# Patient Record
Sex: MALE | Race: WHITE | NOT HISPANIC OR LATINO | Employment: PART TIME | URBAN - METROPOLITAN AREA
[De-identification: names, ages, dates, MRNs, and addresses within clinical notes are randomized per-mention and may not be internally consistent; named-entity substitution may affect disease eponyms.]

---

## 2023-09-28 PROBLEM — Z87.820 PERSONAL HISTORY OF TRAUMATIC BRAIN INJURY: Status: ACTIVE | Noted: 2023-09-28

## 2023-09-28 PROBLEM — G47.9 SLEEP DIFFICULTIES: Status: ACTIVE | Noted: 2023-09-28

## 2023-09-28 PROBLEM — F41.9 ANXIETY: Status: ACTIVE | Noted: 2023-09-28

## 2023-09-28 PROBLEM — F41.1 GENERALIZED ANXIETY DISORDER: Status: ACTIVE | Noted: 2023-09-28

## 2023-09-28 PROBLEM — S09.90XA HEAD INJURY: Status: ACTIVE | Noted: 2023-09-28

## 2023-09-28 PROBLEM — R42 DIZZINESS: Status: ACTIVE | Noted: 2023-09-28

## 2023-09-28 PROBLEM — F32.1 DEPRESSION, MAJOR, SINGLE EPISODE, MODERATE (MULTI): Status: ACTIVE | Noted: 2023-09-28

## 2023-09-28 RX ORDER — SERTRALINE HYDROCHLORIDE 50 MG/1
1 TABLET, FILM COATED ORAL DAILY
COMMUNITY
Start: 2022-06-23

## 2023-09-28 RX ORDER — TALC
1 POWDER (GRAM) TOPICAL NIGHTLY
COMMUNITY

## 2023-10-03 ENCOUNTER — APPOINTMENT (OUTPATIENT)
Dept: BEHAVIORAL HEALTH | Facility: CLINIC | Age: 23
End: 2023-10-03
Payer: COMMERCIAL

## 2025-03-05 ENCOUNTER — APPOINTMENT (OUTPATIENT)
Dept: RADIOLOGY | Facility: HOSPITAL | Age: 25
End: 2025-03-05
Payer: COMMERCIAL

## 2025-03-05 ENCOUNTER — APPOINTMENT (OUTPATIENT)
Dept: CARDIOLOGY | Facility: HOSPITAL | Age: 25
End: 2025-03-05
Payer: COMMERCIAL

## 2025-03-05 ENCOUNTER — HOSPITAL ENCOUNTER (INPATIENT)
Facility: HOSPITAL | Age: 25
LOS: 2 days | Discharge: HOME | End: 2025-03-07
Attending: EMERGENCY MEDICINE | Admitting: STUDENT IN AN ORGANIZED HEALTH CARE EDUCATION/TRAINING PROGRAM
Payer: COMMERCIAL

## 2025-03-05 DIAGNOSIS — D72.829 LEUKOCYTOSIS, UNSPECIFIED TYPE: ICD-10-CM

## 2025-03-05 DIAGNOSIS — K52.9 ENTEROCOLITIS: Primary | ICD-10-CM

## 2025-03-05 DIAGNOSIS — Z78.9 MALE-TO-FEMALE TRANSGENDER PERSON: ICD-10-CM

## 2025-03-05 LAB
ALBUMIN SERPL BCP-MCNC: 5.1 G/DL (ref 3.4–5)
ALP SERPL-CCNC: 83 U/L (ref 33–120)
ALT SERPL W P-5'-P-CCNC: 11 U/L (ref 10–52)
ANION GAP SERPL CALC-SCNC: 15 MMOL/L (ref 10–20)
APPEARANCE UR: CLEAR
AST SERPL W P-5'-P-CCNC: 16 U/L (ref 9–39)
BASOPHILS # BLD AUTO: 0.09 X10*3/UL (ref 0–0.1)
BASOPHILS NFR BLD AUTO: 0.3 %
BILIRUB SERPL-MCNC: 0.7 MG/DL (ref 0–1.2)
BILIRUB UR STRIP.AUTO-MCNC: NEGATIVE MG/DL
BUN SERPL-MCNC: 16 MG/DL (ref 6–23)
CALCIUM SERPL-MCNC: 9.5 MG/DL (ref 8.6–10.3)
CARDIAC TROPONIN I PNL SERPL HS: <3 NG/L (ref 0–20)
CARDIAC TROPONIN I PNL SERPL HS: <3 NG/L (ref 0–20)
CHLORIDE SERPL-SCNC: 103 MMOL/L (ref 98–107)
CO2 SERPL-SCNC: 24 MMOL/L (ref 21–32)
COLOR UR: ABNORMAL
CREAT SERPL-MCNC: 0.96 MG/DL (ref 0.5–1.3)
CRP SERPL-MCNC: 0.42 MG/DL
D DIMER PPP FEU-MCNC: 281 NG/ML FEU
EGFRCR SERPLBLD CKD-EPI 2021: >90 ML/MIN/1.73M*2
EOSINOPHIL # BLD AUTO: 0.05 X10*3/UL (ref 0–0.7)
EOSINOPHIL NFR BLD AUTO: 0.1 %
ERYTHROCYTE [DISTWIDTH] IN BLOOD BY AUTOMATED COUNT: 11.4 % (ref 11.5–14.5)
ERYTHROCYTE [SEDIMENTATION RATE] IN BLOOD BY WESTERGREN METHOD: 1 MM/H (ref 0–20)
GLUCOSE BLD MANUAL STRIP-MCNC: 120 MG/DL (ref 74–99)
GLUCOSE BLD MANUAL STRIP-MCNC: 135 MG/DL (ref 74–99)
GLUCOSE SERPL-MCNC: 118 MG/DL (ref 74–99)
GLUCOSE UR STRIP.AUTO-MCNC: NORMAL MG/DL
HCT VFR BLD AUTO: 47.3 % (ref 41–52)
HGB BLD-MCNC: 16.1 G/DL (ref 13.5–17.5)
IMM GRANULOCYTES # BLD AUTO: 0.21 X10*3/UL (ref 0–0.7)
IMM GRANULOCYTES NFR BLD AUTO: 0.6 % (ref 0–0.9)
KETONES UR STRIP.AUTO-MCNC: ABNORMAL MG/DL
LACTATE SERPL-SCNC: 1.7 MMOL/L (ref 0.4–2)
LEUKOCYTE ESTERASE UR QL STRIP.AUTO: NEGATIVE
LYMPHOCYTES # BLD AUTO: 1.02 X10*3/UL (ref 1.2–4.8)
LYMPHOCYTES NFR BLD AUTO: 2.9 %
MAGNESIUM SERPL-MCNC: 1.67 MG/DL (ref 1.6–2.4)
MCH RBC QN AUTO: 30.6 PG (ref 26–34)
MCHC RBC AUTO-ENTMCNC: 34 G/DL (ref 32–36)
MCV RBC AUTO: 90 FL (ref 80–100)
MONOCYTES # BLD AUTO: 1.5 X10*3/UL (ref 0.1–1)
MONOCYTES NFR BLD AUTO: 4.3 %
NEUTROPHILS # BLD AUTO: 32.22 X10*3/UL (ref 1.2–7.7)
NEUTROPHILS NFR BLD AUTO: 91.8 %
NITRITE UR QL STRIP.AUTO: NEGATIVE
NRBC BLD-RTO: 0 /100 WBCS (ref 0–0)
PH UR STRIP.AUTO: 6 [PH]
PLATELET # BLD AUTO: 373 X10*3/UL (ref 150–450)
POTASSIUM SERPL-SCNC: 3.7 MMOL/L (ref 3.5–5.3)
PROT SERPL-MCNC: 8 G/DL (ref 6.4–8.2)
PROT UR STRIP.AUTO-MCNC: NEGATIVE MG/DL
RBC # BLD AUTO: 5.27 X10*6/UL (ref 4.5–5.9)
RBC # UR STRIP.AUTO: NEGATIVE MG/DL
SODIUM SERPL-SCNC: 138 MMOL/L (ref 136–145)
SP GR UR STRIP.AUTO: 1.05
UROBILINOGEN UR STRIP.AUTO-MCNC: NORMAL MG/DL
WBC # BLD AUTO: 35.1 X10*3/UL (ref 4.4–11.3)

## 2025-03-05 PROCEDURE — 99285 EMERGENCY DEPT VISIT HI MDM: CPT | Mod: 25 | Performed by: EMERGENCY MEDICINE

## 2025-03-05 PROCEDURE — 80053 COMPREHEN METABOLIC PANEL: CPT

## 2025-03-05 PROCEDURE — 2500000004 HC RX 250 GENERAL PHARMACY W/ HCPCS (ALT 636 FOR OP/ED)

## 2025-03-05 PROCEDURE — 2060000001 HC INTERMEDIATE ICU ROOM DAILY

## 2025-03-05 PROCEDURE — 74177 CT ABD & PELVIS W/CONTRAST: CPT | Mod: FOREIGN READ | Performed by: RADIOLOGY

## 2025-03-05 PROCEDURE — 93005 ELECTROCARDIOGRAM TRACING: CPT

## 2025-03-05 PROCEDURE — 96365 THER/PROPH/DIAG IV INF INIT: CPT

## 2025-03-05 PROCEDURE — 96367 TX/PROPH/DG ADDL SEQ IV INF: CPT

## 2025-03-05 PROCEDURE — 2550000001 HC RX 255 CONTRASTS: Performed by: EMERGENCY MEDICINE

## 2025-03-05 PROCEDURE — 87040 BLOOD CULTURE FOR BACTERIA: CPT | Mod: STJLAB

## 2025-03-05 PROCEDURE — 83605 ASSAY OF LACTIC ACID: CPT

## 2025-03-05 PROCEDURE — 85379 FIBRIN DEGRADATION QUANT: CPT

## 2025-03-05 PROCEDURE — 93010 ELECTROCARDIOGRAM REPORT: CPT | Performed by: INTERNAL MEDICINE

## 2025-03-05 PROCEDURE — 96375 TX/PRO/DX INJ NEW DRUG ADDON: CPT

## 2025-03-05 PROCEDURE — 85652 RBC SED RATE AUTOMATED: CPT

## 2025-03-05 PROCEDURE — 74177 CT ABD & PELVIS W/CONTRAST: CPT

## 2025-03-05 PROCEDURE — 96361 HYDRATE IV INFUSION ADD-ON: CPT

## 2025-03-05 PROCEDURE — 83735 ASSAY OF MAGNESIUM: CPT

## 2025-03-05 PROCEDURE — 2500000001 HC RX 250 WO HCPCS SELF ADMINISTERED DRUGS (ALT 637 FOR MEDICARE OP)

## 2025-03-05 PROCEDURE — 85025 COMPLETE CBC W/AUTO DIFF WBC: CPT

## 2025-03-05 PROCEDURE — 86140 C-REACTIVE PROTEIN: CPT

## 2025-03-05 PROCEDURE — 99223 1ST HOSP IP/OBS HIGH 75: CPT

## 2025-03-05 PROCEDURE — 71045 X-RAY EXAM CHEST 1 VIEW: CPT | Mod: FOREIGN READ | Performed by: RADIOLOGY

## 2025-03-05 PROCEDURE — 96376 TX/PRO/DX INJ SAME DRUG ADON: CPT

## 2025-03-05 PROCEDURE — 81003 URINALYSIS AUTO W/O SCOPE: CPT

## 2025-03-05 PROCEDURE — 82947 ASSAY GLUCOSE BLOOD QUANT: CPT

## 2025-03-05 PROCEDURE — 36415 COLL VENOUS BLD VENIPUNCTURE: CPT

## 2025-03-05 PROCEDURE — 84484 ASSAY OF TROPONIN QUANT: CPT

## 2025-03-05 PROCEDURE — 99285 EMERGENCY DEPT VISIT HI MDM: CPT | Performed by: EMERGENCY MEDICINE

## 2025-03-05 PROCEDURE — 71045 X-RAY EXAM CHEST 1 VIEW: CPT

## 2025-03-05 RX ORDER — ESTRADIOL 1 MG/1
4 TABLET ORAL EVERY MORNING
Status: DISCONTINUED | OUTPATIENT
Start: 2025-03-06 | End: 2025-03-07 | Stop reason: HOSPADM

## 2025-03-05 RX ORDER — DICYCLOMINE HYDROCHLORIDE 10 MG/1
10 CAPSULE ORAL ONCE
Status: CANCELLED | OUTPATIENT
Start: 2025-03-05 | End: 2025-03-05

## 2025-03-05 RX ORDER — ONDANSETRON HYDROCHLORIDE 2 MG/ML
4 INJECTION, SOLUTION INTRAVENOUS EVERY 4 HOURS PRN
Status: DISCONTINUED | OUTPATIENT
Start: 2025-03-05 | End: 2025-03-07 | Stop reason: HOSPADM

## 2025-03-05 RX ORDER — ESTRADIOL 2 MG/1
4 TABLET ORAL EVERY MORNING
Status: ON HOLD | COMMUNITY
End: 2025-03-07

## 2025-03-05 RX ORDER — SODIUM CHLORIDE 9 MG/ML
100 INJECTION, SOLUTION INTRAVENOUS CONTINUOUS
Status: ACTIVE | OUTPATIENT
Start: 2025-03-05 | End: 2025-03-06

## 2025-03-05 RX ORDER — SPIRONOLACTONE 50 MG/1
100 TABLET, FILM COATED ORAL 2 TIMES DAILY
Status: DISCONTINUED | OUTPATIENT
Start: 2025-03-05 | End: 2025-03-07 | Stop reason: HOSPADM

## 2025-03-05 RX ORDER — SPIRONOLACTONE 100 MG/1
100 TABLET, FILM COATED ORAL 2 TIMES DAILY
COMMUNITY

## 2025-03-05 RX ORDER — ONDANSETRON HYDROCHLORIDE 2 MG/ML
4 INJECTION, SOLUTION INTRAVENOUS ONCE
Status: COMPLETED | OUTPATIENT
Start: 2025-03-05 | End: 2025-03-05

## 2025-03-05 RX ORDER — METRONIDAZOLE 500 MG/100ML
500 INJECTION, SOLUTION INTRAVENOUS EVERY 8 HOURS
Status: DISCONTINUED | OUTPATIENT
Start: 2025-03-06 | End: 2025-03-06

## 2025-03-05 RX ORDER — PROGESTERONE 200 MG/1
200 CAPSULE ORAL NIGHTLY
COMMUNITY

## 2025-03-05 RX ORDER — ONDANSETRON HYDROCHLORIDE 2 MG/ML
4 INJECTION, SOLUTION INTRAVENOUS ONCE
Status: CANCELLED | OUTPATIENT
Start: 2025-03-05 | End: 2025-03-05

## 2025-03-05 RX ORDER — ESTRADIOL 2 MG/1
2 TABLET ORAL NIGHTLY
COMMUNITY

## 2025-03-05 RX ORDER — PANTOPRAZOLE SODIUM 40 MG/10ML
40 INJECTION, POWDER, LYOPHILIZED, FOR SOLUTION INTRAVENOUS DAILY
Status: DISCONTINUED | OUTPATIENT
Start: 2025-03-05 | End: 2025-03-07 | Stop reason: HOSPADM

## 2025-03-05 RX ORDER — PROGESTERONE 200 MG/1
200 CAPSULE ORAL NIGHTLY
Status: DISCONTINUED | OUTPATIENT
Start: 2025-03-05 | End: 2025-03-07 | Stop reason: HOSPADM

## 2025-03-05 RX ORDER — METRONIDAZOLE 500 MG/100ML
500 INJECTION, SOLUTION INTRAVENOUS ONCE
Status: COMPLETED | OUTPATIENT
Start: 2025-03-05 | End: 2025-03-05

## 2025-03-05 RX ORDER — ESTRADIOL 1 MG/1
2 TABLET ORAL NIGHTLY
Status: DISCONTINUED | OUTPATIENT
Start: 2025-03-05 | End: 2025-03-07 | Stop reason: HOSPADM

## 2025-03-05 RX ADMIN — CEFTRIAXONE 2 G: 2 INJECTION, POWDER, FOR SOLUTION INTRAMUSCULAR; INTRAVENOUS at 16:41

## 2025-03-05 RX ADMIN — SODIUM CHLORIDE, POTASSIUM CHLORIDE, SODIUM LACTATE AND CALCIUM CHLORIDE 1000 ML: 600; 310; 30; 20 INJECTION, SOLUTION INTRAVENOUS at 15:29

## 2025-03-05 RX ADMIN — IOHEXOL 75 ML: 350 INJECTION, SOLUTION INTRAVENOUS at 16:46

## 2025-03-05 RX ADMIN — ONDANSETRON 4 MG: 2 INJECTION INTRAMUSCULAR; INTRAVENOUS at 15:29

## 2025-03-05 RX ADMIN — ONDANSETRON 4 MG: 2 INJECTION INTRAMUSCULAR; INTRAVENOUS at 22:23

## 2025-03-05 RX ADMIN — PANTOPRAZOLE SODIUM 40 MG: 40 INJECTION, POWDER, FOR SOLUTION INTRAVENOUS at 21:18

## 2025-03-05 RX ADMIN — SODIUM CHLORIDE 75 ML/HR: 9 INJECTION, SOLUTION INTRAVENOUS at 20:36

## 2025-03-05 RX ADMIN — SODIUM CHLORIDE 500 ML: 9 INJECTION, SOLUTION INTRAVENOUS at 20:33

## 2025-03-05 RX ADMIN — SODIUM CHLORIDE, SODIUM LACTATE, POTASSIUM CHLORIDE, AND CALCIUM CHLORIDE 1000 ML: .6; .31; .03; .02 INJECTION, SOLUTION INTRAVENOUS at 16:15

## 2025-03-05 RX ADMIN — ONDANSETRON 4 MG: 2 INJECTION INTRAMUSCULAR; INTRAVENOUS at 19:42

## 2025-03-05 RX ADMIN — METRONIDAZOLE 500 MG: 500 INJECTION, SOLUTION INTRAVENOUS at 17:35

## 2025-03-05 RX ADMIN — ESTRADIOL 2 MG: 1 TABLET ORAL at 21:18

## 2025-03-05 SDOH — SOCIAL STABILITY: SOCIAL INSECURITY: DO YOU FEEL ANYONE HAS EXPLOITED OR TAKEN ADVANTAGE OF YOU FINANCIALLY OR OF YOUR PERSONAL PROPERTY?: NO

## 2025-03-05 SDOH — ECONOMIC STABILITY: FOOD INSECURITY: WITHIN THE PAST 12 MONTHS, YOU WORRIED THAT YOUR FOOD WOULD RUN OUT BEFORE YOU GOT THE MONEY TO BUY MORE.: NEVER TRUE

## 2025-03-05 SDOH — SOCIAL STABILITY: SOCIAL INSECURITY
WITHIN THE LAST YEAR, HAVE YOU BEEN RAPED OR FORCED TO HAVE ANY KIND OF SEXUAL ACTIVITY BY YOUR PARTNER OR EX-PARTNER?: NO

## 2025-03-05 SDOH — SOCIAL STABILITY: SOCIAL INSECURITY
WITHIN THE LAST YEAR, HAVE YOU BEEN KICKED, HIT, SLAPPED, OR OTHERWISE PHYSICALLY HURT BY YOUR PARTNER OR EX-PARTNER?: NO

## 2025-03-05 SDOH — SOCIAL STABILITY: SOCIAL INSECURITY: WITHIN THE LAST YEAR, HAVE YOU BEEN AFRAID OF YOUR PARTNER OR EX-PARTNER?: NO

## 2025-03-05 SDOH — ECONOMIC STABILITY: FOOD INSECURITY: WITHIN THE PAST 12 MONTHS, THE FOOD YOU BOUGHT JUST DIDN'T LAST AND YOU DIDN'T HAVE MONEY TO GET MORE.: NEVER TRUE

## 2025-03-05 SDOH — ECONOMIC STABILITY: HOUSING INSECURITY: IN THE LAST 12 MONTHS, WAS THERE A TIME WHEN YOU WERE NOT ABLE TO PAY THE MORTGAGE OR RENT ON TIME?: NO

## 2025-03-05 SDOH — SOCIAL STABILITY: SOCIAL INSECURITY: DO YOU FEEL UNSAFE GOING BACK TO THE PLACE WHERE YOU ARE LIVING?: NO

## 2025-03-05 SDOH — ECONOMIC STABILITY: FOOD INSECURITY: HOW HARD IS IT FOR YOU TO PAY FOR THE VERY BASICS LIKE FOOD, HOUSING, MEDICAL CARE, AND HEATING?: VERY HARD

## 2025-03-05 SDOH — SOCIAL STABILITY: SOCIAL INSECURITY: HAVE YOU HAD THOUGHTS OF HARMING ANYONE ELSE?: NO

## 2025-03-05 SDOH — SOCIAL STABILITY: SOCIAL INSECURITY: ABUSE: ADULT

## 2025-03-05 SDOH — ECONOMIC STABILITY: INCOME INSECURITY: IN THE PAST 12 MONTHS HAS THE ELECTRIC, GAS, OIL, OR WATER COMPANY THREATENED TO SHUT OFF SERVICES IN YOUR HOME?: YES

## 2025-03-05 SDOH — SOCIAL STABILITY: SOCIAL INSECURITY: ARE YOU OR HAVE YOU BEEN THREATENED OR ABUSED PHYSICALLY, EMOTIONALLY, OR SEXUALLY BY ANYONE?: NO

## 2025-03-05 SDOH — ECONOMIC STABILITY: HOUSING INSECURITY: AT ANY TIME IN THE PAST 12 MONTHS, WERE YOU HOMELESS OR LIVING IN A SHELTER (INCLUDING NOW)?: NO

## 2025-03-05 SDOH — SOCIAL STABILITY: SOCIAL INSECURITY: WERE YOU ABLE TO COMPLETE ALL THE BEHAVIORAL HEALTH SCREENINGS?: YES

## 2025-03-05 SDOH — SOCIAL STABILITY: SOCIAL INSECURITY: WITHIN THE LAST YEAR, HAVE YOU BEEN HUMILIATED OR EMOTIONALLY ABUSED IN OTHER WAYS BY YOUR PARTNER OR EX-PARTNER?: NO

## 2025-03-05 SDOH — SOCIAL STABILITY: SOCIAL INSECURITY: HAS ANYONE EVER THREATENED TO HURT YOUR FAMILY OR YOUR PETS?: NO

## 2025-03-05 SDOH — SOCIAL STABILITY: SOCIAL INSECURITY: HAVE YOU HAD ANY THOUGHTS OF HARMING ANYONE ELSE?: NO

## 2025-03-05 SDOH — SOCIAL STABILITY: SOCIAL INSECURITY: DOES ANYONE TRY TO KEEP YOU FROM HAVING/CONTACTING OTHER FRIENDS OR DOING THINGS OUTSIDE YOUR HOME?: NO

## 2025-03-05 SDOH — ECONOMIC STABILITY: HOUSING INSECURITY: IN THE PAST 12 MONTHS, HOW MANY TIMES HAVE YOU MOVED WHERE YOU WERE LIVING?: 1

## 2025-03-05 SDOH — SOCIAL STABILITY: SOCIAL INSECURITY: ARE THERE ANY APPARENT SIGNS OF INJURIES/BEHAVIORS THAT COULD BE RELATED TO ABUSE/NEGLECT?: NO

## 2025-03-05 SDOH — ECONOMIC STABILITY: TRANSPORTATION INSECURITY: IN THE PAST 12 MONTHS, HAS LACK OF TRANSPORTATION KEPT YOU FROM MEDICAL APPOINTMENTS OR FROM GETTING MEDICATIONS?: NO

## 2025-03-05 ASSESSMENT — COGNITIVE AND FUNCTIONAL STATUS - GENERAL
DAILY ACTIVITIY SCORE: 24
PATIENT BASELINE BEDBOUND: NO
MOBILITY SCORE: 24

## 2025-03-05 ASSESSMENT — LIFESTYLE VARIABLES
HOW OFTEN DO YOU HAVE 6 OR MORE DRINKS ON ONE OCCASION: NEVER
TOTAL SCORE: 0
HOW OFTEN DO YOU HAVE A DRINK CONTAINING ALCOHOL: NEVER
HOW MANY STANDARD DRINKS CONTAINING ALCOHOL DO YOU HAVE ON A TYPICAL DAY: PATIENT DOES NOT DRINK
HAVE PEOPLE ANNOYED YOU BY CRITICIZING YOUR DRINKING: NO
SKIP TO QUESTIONS 9-10: 1
EVER HAD A DRINK FIRST THING IN THE MORNING TO STEADY YOUR NERVES TO GET RID OF A HANGOVER: NO
EVER FELT BAD OR GUILTY ABOUT YOUR DRINKING: NO
HAVE YOU EVER FELT YOU SHOULD CUT DOWN ON YOUR DRINKING: NO
AUDIT-C TOTAL SCORE: 0
AUDIT-C TOTAL SCORE: 0

## 2025-03-05 ASSESSMENT — ENCOUNTER SYMPTOMS
LIGHT-HEADEDNESS: 1
SHORTNESS OF BREATH: 1
CONSTIPATION: 0
FEVER: 0
NAUSEA: 1
ABDOMINAL PAIN: 1
CHILLS: 1
PALPITATIONS: 0
VOMITING: 1
BLOOD IN STOOL: 0
DIARRHEA: 1

## 2025-03-05 ASSESSMENT — PATIENT HEALTH QUESTIONNAIRE - PHQ9
2. FEELING DOWN, DEPRESSED OR HOPELESS: SEVERAL DAYS
SUM OF ALL RESPONSES TO PHQ9 QUESTIONS 1 & 2: 2
1. LITTLE INTEREST OR PLEASURE IN DOING THINGS: SEVERAL DAYS

## 2025-03-05 ASSESSMENT — ACTIVITIES OF DAILY LIVING (ADL)
HEARING - RIGHT EAR: FUNCTIONAL
JUDGMENT_ADEQUATE_SAFELY_COMPLETE_DAILY_ACTIVITIES: YES
PATIENT'S MEMORY ADEQUATE TO SAFELY COMPLETE DAILY ACTIVITIES?: YES
LACK_OF_TRANSPORTATION: NO
PATIENT'S MEMORY ADEQUATE TO SAFELY COMPLETE DAILY ACTIVITIES?: YES
HEARING - LEFT EAR: FUNCTIONAL
JUDGMENT_ADEQUATE_SAFELY_COMPLETE_DAILY_ACTIVITIES: YES
DRESSING YOURSELF: INDEPENDENT
TOILETING: INDEPENDENT
HEARING - LEFT EAR: FUNCTIONAL
FEEDING YOURSELF: INDEPENDENT
ADEQUATE_TO_COMPLETE_ADL: YES
GROOMING: INDEPENDENT
BATHING: INDEPENDENT
LACK_OF_TRANSPORTATION: NO
HEARING - RIGHT EAR: FUNCTIONAL
WALKS IN HOME: INDEPENDENT
DRESSING YOURSELF: INDEPENDENT
BATHING: INDEPENDENT
GROOMING: INDEPENDENT
TOILETING: INDEPENDENT
ADEQUATE_TO_COMPLETE_ADL: YES
WALKS IN HOME: INDEPENDENT
FEEDING YOURSELF: INDEPENDENT

## 2025-03-05 ASSESSMENT — PAIN - FUNCTIONAL ASSESSMENT
PAIN_FUNCTIONAL_ASSESSMENT: 0-10
PAIN_FUNCTIONAL_ASSESSMENT: 0-10

## 2025-03-05 ASSESSMENT — PAIN SCALES - GENERAL
PAINLEVEL_OUTOF10: 0 - NO PAIN
PAINLEVEL_OUTOF10: 2
PAINLEVEL_OUTOF10: 0 - NO PAIN

## 2025-03-05 ASSESSMENT — PAIN DESCRIPTION - DESCRIPTORS: DESCRIPTORS: TIGHTNESS

## 2025-03-05 NOTE — ED PROVIDER NOTES
History of Present Illness     History provided by: Patient  Limitations to History: None  External Records Reviewed with Brief Summary: Outpatient progress note from 12/12/2024 which showed patient's gender affirming care, most recent med list.    HPI:  Layen Razo is a 24 y.o. adult transgender female with past medical history of TBI, JOSEMANUEL, depression, anxiety, on gender affirming hormonal therapy (estradiol and spironolactone)who presents to the [facility] ED with nausea, vomiting, diarrhea that began this morning.  The patient also endorses spotting of vision and feeling that she may pass out.  The patient states they tried eating ice cream this morning but did end up having a bout of emesis after.  The emesis has been orange like in color.  She describes her abdominal pain worse around her bellybutton, suprapubically but goes across the entire lower abdomen as if someone was squeezing.  She also had about 5 bouts of watery brown diarrhea.  She also endorses chills.  She states that her mother was ill with similar symptoms over the weekend.  Patient denies any recent steroids, antibiotics or any other medications.  Patient denies any recent recreational drug use.    Physical Exam   Triage vitals:  T 36.5 °C (97.7 °F)  HR 80  BP (!) 77/45  RR 18  O2 99 % None (Room air)    Physical Exam  Vitals and nursing note reviewed.   Constitutional:       General: She is not in acute distress.     Appearance: Normal appearance. She is ill-appearing. She is not toxic-appearing or diaphoretic.   HENT:      Head: Normocephalic and atraumatic.      Right Ear: External ear normal.      Left Ear: External ear normal.      Mouth/Throat:      Mouth: Mucous membranes are dry.      Pharynx: No oropharyngeal exudate or posterior oropharyngeal erythema.   Eyes:      General: No scleral icterus.     Extraocular Movements: Extraocular movements intact.      Conjunctiva/sclera: Conjunctivae normal.      Pupils: Pupils are equal, round, and  reactive to light.   Cardiovascular:      Rate and Rhythm: Normal rate and regular rhythm.      Pulses: Normal pulses.   Pulmonary:      Effort: Pulmonary effort is normal. No respiratory distress.      Breath sounds: Normal breath sounds. No wheezing, rhonchi or rales.   Chest:      Chest wall: No tenderness.   Abdominal:      General: Abdomen is flat. Bowel sounds are normal. There is no distension.      Palpations: Abdomen is soft. There is no mass.      Tenderness: There is abdominal tenderness. There is guarding. There is no right CVA tenderness, left CVA tenderness or rebound.   Musculoskeletal:      Cervical back: Neck supple. No rigidity.      Right lower leg: No edema.      Left lower leg: No edema.   Lymphadenopathy:      Cervical: No cervical adenopathy.   Skin:     General: Skin is warm and dry.      Capillary Refill: Capillary refill takes 2 to 3 seconds.      Coloration: Skin is pale. Skin is not jaundiced.   Neurological:      General: No focal deficit present.      Mental Status: She is alert and oriented to person, place, and time. Mental status is at baseline.   Psychiatric:         Mood and Affect: Mood normal.         Thought Content: Thought content normal.          Medical Decision Making & ED Course   Medical Decision Makin y.o. adult transgender female presenting with sudden onset of abdominal pain, diarrhea, vomiting that began this morning.  Patient has had 5 bouts of watery brown diarrhea, orange emesis, bandlike squeezing abdominal pain along the lower abdomen.  Patient initially hypotensive with BP 77/45.  This was fluid responsive with BP resolution to 1 teens over 60s.  Patient's heart rate ranged from 80s to 90s.  SpO2 99% on room air.  Physical examination patient is tender to palpation periumbilically in the left lower quadrant and right lower quadrant with description of referred pain back to the Koko umbilical area.  His abdomen is soft, nondistended with normal bowel  sounds otherwise.  No lower extremity edema.  On cardiac examination patient has regular rate and rhythm.  EKG was ordered due to patient complaining of chest pain.  EKG showed normal sinus rhythm initially with repeat showing sinus tachycardia with no ST changes.  Troponins negative.  Low suspicion for ACS.  Due to patient's initial hypotension and CBC with significant leukocytosis of WBC 35.1, sepsis protocol was activated with blood cultures collected, IV fluids, lactate, empiric antibiotics.  D-dimer was also ordered as patient cannot be perked out and is on hormonal treatment.  D-dimer resulted to 81, low suspicion for acute PE.  Lactate 1.7, not sepsis.  CT abdomen pelvis ordered to assess for possible abdominal pathology causing patient's symptoms and abnormal labs.  She was treated with IV fluids, Zofran, empiric Rocephin and Flagyl for possible abdominal pathology.  C. difficile stool PCR ordered.  CTAP with IV contrast resolved with mild enterocolitis.  Overall low suspicion for acute appendicitis, pancreatitis, cholecystitis.  Due to patient's significant leukocytosis, colitis,  inability to tolerate p.o. will be admitted to hospital medicine further evaluation and management.  ----     Social Determinants of Health which Significantly Impact Care: None identified   EKG Independent Interpretation: EKG interpreted by myself. Please see ED Course for full interpretation.    Independent Result Review and Interpretation: Relevant laboratory and radiographic results were reviewed and independently interpreted by myself.  As necessary, they are commented on in the ED Course.    Chronic conditions affecting the patient's care: As documented above in [facility]    The patient was discussed with the following consultants/services: Hospitalist/Admitting Provider who accepted the patient for admission    Care Considerations: As documented above in [facility]    ED Course:  ED Course as of 03/05/25 2208   Wed Mar 05, 2025   1630  Neutrophils Absolute(!): 32.22 [DS]   1707 XR chest 1 view  Chest x-ray with no acute cardiopulmonary process. [DS]   1746 CT abdomen pelvis w IV contrast  CTAP with IV contrast with mild enterocolitis.  No signs of appendicitis, acute cholecystitis, pancreatitis or any other abnormalities. [DS]      ED Course User Index  [DS] Grecia Sylvester DO         Diagnoses as of 03/05/25 2208   Enterocolitis   Leukocytosis, unspecified type     Disposition   As a result of their workup, the patient will require admission to the hospital.  The patient was informed of her diagnosis.  The patient was given the opportunity to ask questions and I answered them. The patient agreed to be admitted to the hospital.    Procedures   Procedures    Patient seen and discussed with ED attending physician.    Grecia Sylvester DO  Family Medicine     This note has been transcribed using Dragon voice recognition system and there is a possibility of unintentional typing misprints.  Any information found to be copied from previous providers is done in the best interest of the patient to provide accurate, quality, and continuity of care.       Grecia Sylvester DO  Resident  03/05/25 2208

## 2025-03-06 LAB
ALBUMIN SERPL BCP-MCNC: 3.5 G/DL (ref 3.4–5)
ALP SERPL-CCNC: 52 U/L (ref 33–120)
ALT SERPL W P-5'-P-CCNC: 9 U/L (ref 7–52)
ANION GAP SERPL CALC-SCNC: 12 MMOL/L (ref 10–20)
AST SERPL W P-5'-P-CCNC: 11 U/L (ref 9–39)
ATRIAL RATE: 106 BPM
ATRIAL RATE: 82 BPM
BILIRUB SERPL-MCNC: 0.5 MG/DL (ref 0–1.2)
BUN SERPL-MCNC: 14 MG/DL (ref 6–23)
C COLI+JEJ+UPSA DNA STL QL NAA+PROBE: NOT DETECTED
C DIF TOX TCDA+TCDB STL QL NAA+PROBE: NOT DETECTED
CALCIUM SERPL-MCNC: 7.6 MG/DL (ref 8.6–10.3)
CHLORIDE SERPL-SCNC: 107 MMOL/L (ref 98–107)
CO2 SERPL-SCNC: 22 MMOL/L (ref 21–32)
CREAT SERPL-MCNC: 0.75 MG/DL (ref 0.5–1.3)
EC STX1 GENE STL QL NAA+PROBE: NOT DETECTED
EC STX2 GENE STL QL NAA+PROBE: NOT DETECTED
EGFRCR SERPLBLD CKD-EPI 2021: >90 ML/MIN/1.73M*2
ERYTHROCYTE [DISTWIDTH] IN BLOOD BY AUTOMATED COUNT: 11.6 % (ref 11.5–14.5)
GLUCOSE BLD MANUAL STRIP-MCNC: 121 MG/DL (ref 74–99)
GLUCOSE BLD MANUAL STRIP-MCNC: 153 MG/DL (ref 74–99)
GLUCOSE SERPL-MCNC: 114 MG/DL (ref 74–99)
HCT VFR BLD AUTO: 39.1 % (ref 36–52)
HGB BLD-MCNC: 13.4 G/DL (ref 12–17.5)
HIV 1+2 AB+HIV1 P24 AG SERPL QL IA: NONREACTIVE
HOLD SPECIMEN: NORMAL
MAGNESIUM SERPL-MCNC: 1.43 MG/DL (ref 1.6–2.4)
MCH RBC QN AUTO: 30.5 PG (ref 26–34)
MCHC RBC AUTO-ENTMCNC: 34.3 G/DL (ref 32–36)
MCV RBC AUTO: 89 FL (ref 80–100)
NOROVIRUS GI + GII RNA STL NAA+PROBE: DETECTED
NRBC BLD-RTO: 0 /100 WBCS (ref 0–0)
P AXIS: 52 DEGREES
P AXIS: 63 DEGREES
P OFFSET: 193 MS
P OFFSET: 196 MS
P ONSET: 140 MS
P ONSET: 143 MS
PHOSPHATE SERPL-MCNC: 2.8 MG/DL (ref 2.5–4.9)
PLATELET # BLD AUTO: 237 X10*3/UL (ref 150–450)
POTASSIUM SERPL-SCNC: 3.3 MMOL/L (ref 3.5–5.3)
PR INTERVAL: 154 MS
PR INTERVAL: 162 MS
PROT SERPL-MCNC: 5.6 G/DL (ref 6.4–8.2)
Q ONSET: 220 MS
Q ONSET: 221 MS
QRS COUNT: 14 BEATS
QRS COUNT: 17 BEATS
QRS DURATION: 94 MS
QRS DURATION: 98 MS
QT INTERVAL: 348 MS
QT INTERVAL: 374 MS
QTC CALCULATION(BAZETT): 436 MS
QTC CALCULATION(BAZETT): 462 MS
QTC FREDERICIA: 415 MS
QTC FREDERICIA: 420 MS
R AXIS: 62 DEGREES
R AXIS: 74 DEGREES
RBC # BLD AUTO: 4.39 X10*6/UL (ref 4–5.9)
RV RNA STL NAA+PROBE: NOT DETECTED
SALMONELLA DNA STL QL NAA+PROBE: NOT DETECTED
SHIGELLA DNA SPEC QL NAA+PROBE: NOT DETECTED
SODIUM SERPL-SCNC: 138 MMOL/L (ref 136–145)
T AXIS: 63 DEGREES
T AXIS: 75 DEGREES
T OFFSET: 394 MS
T OFFSET: 408 MS
V CHOLERAE DNA STL QL NAA+PROBE: NOT DETECTED
VENTRICULAR RATE: 106 BPM
VENTRICULAR RATE: 82 BPM
WBC # BLD AUTO: 10.5 X10*3/UL (ref 4.4–11.3)
Y ENTEROCOL DNA STL QL NAA+PROBE: NOT DETECTED

## 2025-03-06 PROCEDURE — 85027 COMPLETE CBC AUTOMATED: CPT

## 2025-03-06 PROCEDURE — 99233 SBSQ HOSP IP/OBS HIGH 50: CPT

## 2025-03-06 PROCEDURE — 87491 CHLMYD TRACH DNA AMP PROBE: CPT | Mod: STJLAB

## 2025-03-06 PROCEDURE — 36415 COLL VENOUS BLD VENIPUNCTURE: CPT

## 2025-03-06 PROCEDURE — 82947 ASSAY GLUCOSE BLOOD QUANT: CPT

## 2025-03-06 PROCEDURE — 2500000001 HC RX 250 WO HCPCS SELF ADMINISTERED DRUGS (ALT 637 FOR MEDICARE OP)

## 2025-03-06 PROCEDURE — 87328 CRYPTOSPORIDIUM AG IA: CPT

## 2025-03-06 PROCEDURE — 87493 C DIFF AMPLIFIED PROBE: CPT | Mod: STJLAB

## 2025-03-06 PROCEDURE — 2500000004 HC RX 250 GENERAL PHARMACY W/ HCPCS (ALT 636 FOR OP/ED)

## 2025-03-06 PROCEDURE — 84075 ASSAY ALKALINE PHOSPHATASE: CPT

## 2025-03-06 PROCEDURE — 87506 IADNA-DNA/RNA PROBE TQ 6-11: CPT | Mod: STJLAB

## 2025-03-06 PROCEDURE — 83735 ASSAY OF MAGNESIUM: CPT

## 2025-03-06 PROCEDURE — 84100 ASSAY OF PHOSPHORUS: CPT

## 2025-03-06 PROCEDURE — 1200000002 HC GENERAL ROOM WITH TELEMETRY DAILY

## 2025-03-06 PROCEDURE — 87389 HIV-1 AG W/HIV-1&-2 AB AG IA: CPT | Mod: STJLAB

## 2025-03-06 PROCEDURE — 87329 GIARDIA AG IA: CPT

## 2025-03-06 RX ORDER — PROCHLORPERAZINE EDISYLATE 5 MG/ML
10 INJECTION INTRAMUSCULAR; INTRAVENOUS EVERY 6 HOURS PRN
Status: DISCONTINUED | OUTPATIENT
Start: 2025-03-06 | End: 2025-03-07 | Stop reason: HOSPADM

## 2025-03-06 RX ORDER — MAGNESIUM SULFATE HEPTAHYDRATE 40 MG/ML
2 INJECTION, SOLUTION INTRAVENOUS ONCE
Status: COMPLETED | OUTPATIENT
Start: 2025-03-06 | End: 2025-03-06

## 2025-03-06 RX ORDER — POTASSIUM CHLORIDE 14.9 MG/ML
20 INJECTION INTRAVENOUS
Status: COMPLETED | OUTPATIENT
Start: 2025-03-06 | End: 2025-03-06

## 2025-03-06 RX ADMIN — ESTRADIOL 4 MG: 1 TABLET ORAL at 09:06

## 2025-03-06 RX ADMIN — SODIUM CHLORIDE, POTASSIUM CHLORIDE, SODIUM LACTATE AND CALCIUM CHLORIDE 1000 ML: 600; 310; 30; 20 INJECTION, SOLUTION INTRAVENOUS at 09:06

## 2025-03-06 RX ADMIN — METRONIDAZOLE 500 MG: 500 INJECTION, SOLUTION INTRAVENOUS at 01:05

## 2025-03-06 RX ADMIN — MAGNESIUM SULFATE HEPTAHYDRATE 2 G: 40 INJECTION, SOLUTION INTRAVENOUS at 10:24

## 2025-03-06 RX ADMIN — SODIUM CHLORIDE 75 ML/HR: 9 INJECTION, SOLUTION INTRAVENOUS at 04:16

## 2025-03-06 RX ADMIN — POTASSIUM CHLORIDE 20 MEQ: 14.9 INJECTION, SOLUTION INTRAVENOUS at 12:06

## 2025-03-06 RX ADMIN — CEFTRIAXONE 2 G: 2 INJECTION, POWDER, FOR SOLUTION INTRAMUSCULAR; INTRAVENOUS at 15:34

## 2025-03-06 RX ADMIN — PROCHLORPERAZINE EDISYLATE 10 MG: 5 INJECTION INTRAMUSCULAR; INTRAVENOUS at 01:05

## 2025-03-06 RX ADMIN — PANTOPRAZOLE SODIUM 40 MG: 40 INJECTION, POWDER, FOR SOLUTION INTRAVENOUS at 09:06

## 2025-03-06 RX ADMIN — POTASSIUM CHLORIDE 20 MEQ: 14.9 INJECTION, SOLUTION INTRAVENOUS at 10:24

## 2025-03-06 RX ADMIN — ESTRADIOL 2 MG: 1 TABLET ORAL at 20:48

## 2025-03-06 RX ADMIN — METRONIDAZOLE 500 MG: 500 INJECTION, SOLUTION INTRAVENOUS at 09:06

## 2025-03-06 ASSESSMENT — COGNITIVE AND FUNCTIONAL STATUS - GENERAL
MOBILITY SCORE: 24
DAILY ACTIVITIY SCORE: 24
DAILY ACTIVITIY SCORE: 24
MOBILITY SCORE: 23
CLIMB 3 TO 5 STEPS WITH RAILING: A LITTLE

## 2025-03-06 ASSESSMENT — PAIN - FUNCTIONAL ASSESSMENT
PAIN_FUNCTIONAL_ASSESSMENT: 0-10

## 2025-03-06 ASSESSMENT — PAIN SCALES - GENERAL
PAINLEVEL_OUTOF10: 0 - NO PAIN

## 2025-03-06 NOTE — PROGRESS NOTES
"ID:  Layne Razo is a 24 y.o. adult on hospital day 1 of admission presenting with Enterocolitis.    Subjective   The patient seen and examined this morning at bedside. Overnight, patient reports an additional 4-5 episodes of green emesis and 4-5 episodes of yellow/brown watery diarrhea with mucus and no blood.  Patient states that her abdominal pain has improved but is still present.  Patient is still throwing up with even water, but denies abdominal pain and nausea. Patient reports improvement in nausea and vomiting with zofran and compazine administration. Denies blood in stool, dizziness, lightheadedness, chills, or urinary symptoms. Patient reports feeling warm, head pain that resolves quickly, dry oral orifices, increased thirst, and lack of appetite.     Objective     Visit Vitals  /56 (BP Location: Right arm, Patient Position: Lying)   Pulse 99   Temp 36 °C (96.8 °F) (Temporal)   Resp 16   Ht 1.727 m (5' 8\")   Wt 69.1 kg (152 lb 5.4 oz)   SpO2 95%   BMI 23.16 kg/m²   Smoking Status Never   BSA 1.82 m²        Physical Examination:  Constitutional:       General: She is not in acute distress.     Appearance: She is not ill-appearing.   HENT:      Nose: No congestion or rhinorrhea.      Mouth/Throat:      Mouth: Mucous membranes are dry.   Cardiovascular:      Rate and Rhythm: Regular rhythm. Tachycardia present.      Pulses: Normal pulses.      Heart sounds: No murmur heard.     No gallop.   Pulmonary:      Effort: No respiratory distress.      Breath sounds: No wheezing or rales.   Chest:      Chest wall: No tenderness.   Abdominal:      Palpations: Abdomen is soft. There is no fluid wave, hepatomegaly, splenomegaly or mass.      Tenderness: There is no guarding or rebound.      Comments: Midline/periumbilical abdominal tenderness to palpitation. Active bowel sounds.   Musculoskeletal:         General: No swelling.   Skin:     Coloration: Skin is not jaundiced.      Findings: No erythema, lesion or " rash.   Neurological:      Mental Status: She is oriented to person, place, and time.       Laboratory Data:    Results from last 7 days   Lab Units 03/06/25  0532 03/05/25  1525   WBC AUTO x10*3/uL 10.5 35.1*   RBC AUTO x10*6/uL 4.39 5.27   HEMOGLOBIN g/dL 13.4 16.1     Results from last 7 days   Lab Units 03/06/25  0532 03/05/25  1525   SODIUM mmol/L 138 138   POTASSIUM mmol/L 3.3* 3.7   CHLORIDE mmol/L 107 103   CO2 mmol/L 22 24   BUN mg/dL 14 16   CREATININE mg/dL 0.75 0.96   CALCIUM mg/dL 7.6* 9.5   PHOSPHORUS mg/dL 2.8  --    MAGNESIUM mg/dL 1.43* 1.67   BILIRUBIN TOTAL mg/dL 0.5 0.7   ALT U/L 9 11   AST U/L 11 16       Imaging:  CT abdomen pelvis w IV contrast    Result Date: 3/5/2025  STUDY: CT Abdomen and Pelvis with IV Contrast; 3/5/2025 4:58 PM INDICATION: Abdominal pain, unspecified. COMPARISON: None Available. ACCESSION NUMBER(S): [id number] ORDERING CLINICIAN: ARPAN ROBIN TECHNIQUE: CT of the abdomen and pelvis was performed.  Contiguous axial images were obtained at 3 mm slice thickness through the abdomen and pelvis. Coronal and sagittal reconstructions at 3 mm slice thickness were performed.  Omnipaque 350--75 mL was administered intravenously.  FINDINGS: LOWER CHEST: No cardiomegaly.  No pericardial effusion.  Lung bases are clear.  ABDOMEN:  LIVER: No hepatomegaly.  Smooth surface contour.  Normal attenuation.  BILE DUCTS: No intrahepatic or extrahepatic biliary ductal dilatation.  GALLBLADDER: The gallbladder is present without gallstones. STOMACH: No abnormalities identified.  PANCREAS: No masses or ductal dilatation.  SPLEEN: No splenomegaly or focal splenic lesion.  ADRENAL GLANDS: No thickening or nodules.  KIDNEYS AND URETERS: Kidneys are normal in size and location.  No renal or ureteral calculi.  PELVIS:  BLADDER: No abnormalities identified.  REPRODUCTIVE ORGANS: No abnormalities identified.  BOWEL: .  Appendix is normal.  Minimal thickened small bowel loops throughout the abdomen and  pelvis with additional fluid-filled nondilated and minimally prominent loops without a sharp transition.  Minimal wall thickening of portions of the colon with associated liquid stool  VESSELS: No abnormalities identified.  Abdominal aorta is normal in caliber.  PERITONEUM/RETROPERITONEUM/LYMPH NODES: No free fluid.  No pneumoperitoneum. No lymphadenopathy.  ABDOMINAL WALL: No abnormalities identified. SOFT TISSUES: No abnormalities identified.  BONES: No acute fracture or aggressive osseous lesion.    Findings suggestive of a mild enterocolitis. Signed by Deshawn Xavier MD    XR chest 1 view    Result Date: 3/5/2025  STUDY: Chest Radiograph; 03/05/2025, 4:36 PM INDICATION: Chest pain. COMPARISON: None Available ACCESSION NUMBER(S): [id number] ORDERING CLINICIAN: ARPAN ROBIN TECHNIQUE:  Frontal chest was obtained at 16:36 hours. FINDINGS: CARDIOMEDIASTINAL SILHOUETTE: Cardiomediastinal silhouette is normal in size and configuration.  LUNGS: Lungs are clear.  ABDOMEN: No remarkable upper abdominal findings.  BONES: No acute osseous changes.    No acute cardiopulmonary process. Signed by Bandar Tracey MD      Medications:  Scheduled medications  cefTRIAXone, 2 g, intravenous, q24h  estradiol, 2 mg, oral, Nightly  estradiol, 4 mg, oral, q AM  magnesium sulfate, 2 g, intravenous, Once  metroNIDAZOLE, 500 mg, intravenous, q8h  pantoprazole, 40 mg, intravenous, Daily  potassium chloride, 20 mEq, intravenous, q2h  progesterone, 200 mg, oral, Nightly  [Held by provider] spironolactone, 100 mg, oral, BID      Continuous medications  sodium chloride 0.9%, 100 mL/hr, Last Rate: 100 mL/hr (03/06/25 1040)      PRN medications  PRN medications: ondansetron, prochlorperazine    Assessment    Assessment & Plan   Ms. Layne Razo is a 24 y.o. adult who presents with PMH of TBI, anxiety, depression, and gender confirming hormones (estradiol, spironolactone, and progesterone) presents with mild enterocolitis with significant  nausea, vomiting, diarrhea.  Assessment & Plan  Enterocolitis       #Mild enterocolitis   #Leukocytosis (resolved)  #Hypotension  #Hypokalemia  #Hypomagnesemia  #Hypocalcemia  High concern for infectious gastroenteritis (viral or bacterial). Low concern for ischemic (lactate 1.7), inflammatory IBD (no chronic symptoms), or med-induced (spironolactone - low potassium). Patient had recent sick contact with similar symptoms (mom). Low potassium, magnesium, and calcium likely due to persistent emesis and diarrhea.  - O&P, Giardia, C. Diff, Cryptosporidium, stool pathogen panel pending  -Blood cultures x 2 pending  -CRP, ESR within normal limits  -Potassium 3.3  -Magnesium 1.43   Plan:  - Continue broad spectrum antibiotics Rocephin and Flagyl till stool studies resulted  - Continue Protonix 40 mg daily, Compazine 20 mg PRN, Zofran 4 mg PRN for persistent nausea and vomiting  - Continue holding home med spironolactone due to hypotension  -1 L LR bolus now  - Increase maintenance NaCL IV fluid from 75 mL/h to 100 mL/h to improve dehydration symptoms from persistent vomiting and diarrhea and intolerance to P.O. hydration  - Replete magnesium levels with one time Magnesium sulfate 2 g  - Replete potassium levels with potassium chloride 40 mEq total   - Monitor Calcium levels  - Diet: adult liquid diet; events as tolerated    #Hyperglycemia  Likely stress-induced from acute infection/  - Monitor glucose levels     #High risk sexual exposure  - Waiting on results from HIV, chlamydia, gonorrhea    Chronic conditions  # JOSEMANUEL  # Depression  # Gender affirming hormonal therapy  - Restart home estradiol medication 4 mg every AM and 2 mg every PM  -Holding spironolactone in the setting of hypotension    Global Plan of Care  Fluid: PRN, normal saline at 100/h, 1 L bolus today  Electrolytes: As needed replacing magnesium and potassium today  Nutrition: Full liquid diet  DVT Prophylaxis: Low risk  GI Prophylaxis: Protonix  Code Status:  Full Code   Dispo: Anticipate 1-2 additional midnight stay pending stool studies and symptom resolution    Emergency Contact: Extended Emergency Contact Information  Primary Emergency Contact: Leandra Sexton  Home Phone: [phone]  Relation: Parent  Secondary Emergency Contact: Leandra Manning  Home Phone: [phone]  Relation: None     Patient seen and discussed with the attending.  Signature: Rodney Avila MD, PGY I Internal medicine  Date: March 6, 2025   This note has been transcribed using Dragon voice recognition system and there is a possibility of unintentional typing misprints.  Any information found to be copied from previous providers is done in the best interest of the patient to provide accurate, quality, and continuity of care.

## 2025-03-06 NOTE — CARE PLAN
Admitted to SD during this shift. Patient in SR-ST on monitor, remains on RA, VSS. 1 episode of diarrhea noted, liquid, watery, brown stool and 3 episodes of emesis, green mucous consistency. PRN zofran and compazine given. Flagyl IV given as ordered. NS running at 75mL/hr continuous. Safety maintained. Call light within reach.   Problem: Pain - Adult  Goal: Verbalizes/displays adequate comfort level or baseline comfort level  Outcome: Progressing     Problem: Safety - Adult  Goal: Free from fall injury  Outcome: Progressing     Problem: Discharge Planning  Goal: Discharge to home or other facility with appropriate resources  Outcome: Progressing     Problem: Chronic Conditions and Co-morbidities  Goal: Patient's chronic conditions and co-morbidity symptoms are monitored and maintained or improved  Outcome: Progressing     Problem: Nutrition  Goal: Nutrient intake appropriate for maintaining nutritional needs  Outcome: Progressing

## 2025-03-06 NOTE — CARE PLAN
The patient's goals for the shift include    Problem: Fall/Injury  Goal: Not fall by end of shift  Outcome: Progressing  Goal: Be free from injury by end of the shift  Outcome: Progressing  Goal: Verbalize understanding of personal risk factors for fall in the hospital  Outcome: Progressing  Goal: Verbalize understanding of risk factor reduction measures to prevent injury from fall in the home  Outcome: Progressing  Goal: Use assistive devices by end of the shift  Outcome: Progressing  Goal: Pace activities to prevent fatigue by end of the shift  Outcome: Progressing     Problem: Pain - Adult  Goal: Verbalizes/displays adequate comfort level or baseline comfort level  Outcome: Progressing     Problem: Safety - Adult  Goal: Free from fall injury  Outcome: Progressing     Problem: Discharge Planning  Goal: Discharge to home or other facility with appropriate resources  Outcome: Progressing     Problem: Chronic Conditions and Co-morbidities  Goal: Patient's chronic conditions and co-morbidity symptoms are monitored and maintained or improved  Outcome: Progressing     Problem: Nutrition  Goal: Nutrient intake appropriate for maintaining nutritional needs  Outcome: Progressing       The clinical goals for the shift include pt will remain HDS this shift    Pt stable at this time, free of n/v/diarrhea currently. VS stable.

## 2025-03-06 NOTE — H&P
History Of Present Illness  Layne Razo is a 24 y.o. adult transgender female with PMHx of TBI, JOSEMANUEL, depression, anxiety, on gender affirming hormonal therapy (estradiol, spironolactone, and progesterone), who presents to [facility] from home for abdominal pain/nausea/vomiting/diarrhea.  Patient reports onset of symptoms today.  Reports periumbilical abdominal pain which was 10/10 at onset and has now completely resolved. reports 3-4 episodes of emesis which were orange in color (patient did eat hot Cheetos prior to this).  Reports 5 episodes of diarrhea which were watery and light brown in color with no blood or mucus.  No BM since presentation to ED.  Patient was also very SOB today and reports improvement after ED.  Also notes that she felt lightheaded earlier today and almost passed out but did not fully lose consciousness.  Patient did not fall or hit her head.  Associated chills.  Denies fever, CP, palpitations, urinary burning/stinging/hematuria, or leg swelling.  Patient does have sick contact of her mother a couple of days ago who was sick with similar symptoms and required hospitalization.  Patient does not know exactly what her mother was diagnosed with but reports that her mother was discharged and is doing better.  Patient notes that she has a sensitive stomach and may be lactose intolerant.  Denies new medications, recent antibiotics, or recent travel.  Denies recent recreational drug use.    No family history of inflammatory bowel syndrome.    In ED, temp 97.7 °F, HR 80--> 100s, RR 18, BP 77/45--> 110s/50s, O2 99% on RA.  CBC with leukocytosis WBC 35.1, absolute neutrophils 32.22.  CMP unremarkable.  Troponin negative.  Lactate 1.7.  D-dimer 281.  UA with +1 ketones, no UTI.  Blood cultures x 2 were drawn.  EKG showed NSR with HR 82, NY interval 162, QRS duration 98, QTc 436.  CXR was unremarkable.  CT abdomen pelvis showed mild enterocolitis.  Was treated with sepsis protocol and given 2L IV fluids,  Zofran, Rocephin, and Flagyl.  C. difficile PCR was ordered.         Past Medical History  She has no past medical history on file.    Surgical History  She has a past surgical history that includes Other surgical history (06/23/2022).     Social History  She has no history on file for tobacco use, alcohol use, and drug use.    Family History  Family History   Problem Relation Name Age of Onset    Mental illness Father      Mental illness Other Grandparent     Mental illness Sibling          Allergies  Patient has no known allergies.    Review of Systems   Constitutional:  Positive for chills. Negative for fever.   Respiratory:  Positive for shortness of breath.    Cardiovascular:  Negative for chest pain, palpitations and leg swelling.   Gastrointestinal:  Positive for abdominal pain, diarrhea, nausea and vomiting. Negative for blood in stool and constipation.   Neurological:  Positive for light-headedness.        Physical Exam  Constitutional:       General: She is not in acute distress.     Appearance: Normal appearance.   HENT:      Head: Normocephalic and atraumatic.   Cardiovascular:      Rate and Rhythm: Tachycardia present.   Neurological:      Mental Status: She is alert.          Last Recorded Vitals  /55   Pulse (!) 118   Temp 36.5 °C (97.7 °F) (Temporal)   Resp (!) 26   Wt 63.5 kg (140 lb)   SpO2 97%     Relevant Results    Scheduled medications  cefTRIAXone, 2 g, intravenous, q24h  [START ON 3/6/2025] cefTRIAXone, 2 g, intravenous, q24h  estradiol, 2 mg, oral, Nightly  [START ON 3/6/2025] estradiol, 4 mg, oral, q AM  [START ON 3/6/2025] metroNIDAZOLE, 500 mg, intravenous, q8h  progesterone, 200 mg, oral, Nightly  [Held by provider] spironolactone, 100 mg, oral, BID      Continuous medications  sodium chloride 0.9%, 75 mL/hr      PRN medications  PRN medications: ondansetron    Results for orders placed or performed during the hospital encounter of 03/05/25 (from the past 24 hours)   D-dimer,  quantitative   Result Value Ref Range    D-Dimer Non VTE, Quant (ng/mL FEU) 281 <=500 ng/mL FEU   CBC and Auto Differential   Result Value Ref Range    WBC 35.1 (H) 4.4 - 11.3 x10*3/uL    nRBC 0.0 0.0 - 0.0 /100 WBCs    RBC 5.27 4.50 - 5.90 x10*6/uL    Hemoglobin 16.1 13.5 - 17.5 g/dL    Hematocrit 47.3 41.0 - 52.0 %    MCV 90 80 - 100 fL    MCH 30.6 26.0 - 34.0 pg    MCHC 34.0 32.0 - 36.0 g/dL    RDW 11.4 (L) 11.5 - 14.5 %    Platelets 373 150 - 450 x10*3/uL    Neutrophils % 91.8 40.0 - 80.0 %    Immature Granulocytes %, Automated 0.6 0.0 - 0.9 %    Lymphocytes % 2.9 13.0 - 44.0 %    Monocytes % 4.3 2.0 - 10.0 %    Eosinophils % 0.1 0.0 - 6.0 %    Basophils % 0.3 0.0 - 2.0 %    Neutrophils Absolute 32.22 (H) 1.20 - 7.70 x10*3/uL    Immature Granulocytes Absolute, Automated 0.21 0.00 - 0.70 x10*3/uL    Lymphocytes Absolute 1.02 (L) 1.20 - 4.80 x10*3/uL    Monocytes Absolute 1.50 (H) 0.10 - 1.00 x10*3/uL    Eosinophils Absolute 0.05 0.00 - 0.70 x10*3/uL    Basophils Absolute 0.09 0.00 - 0.10 x10*3/uL   Comprehensive metabolic panel   Result Value Ref Range    Glucose 118 (H) 74 - 99 mg/dL    Sodium 138 136 - 145 mmol/L    Potassium 3.7 3.5 - 5.3 mmol/L    Chloride 103 98 - 107 mmol/L    Bicarbonate 24 21 - 32 mmol/L    Anion Gap 15 10 - 20 mmol/L    Urea Nitrogen 16 6 - 23 mg/dL    Creatinine 0.96 0.50 - 1.30 mg/dL    eGFR >90 >60 mL/min/1.73m*2    Calcium 9.5 8.6 - 10.3 mg/dL    Albumin 5.1 (H) 3.4 - 5.0 g/dL    Alkaline Phosphatase 83 33 - 120 U/L    Total Protein 8.0 6.4 - 8.2 g/dL    AST 16 9 - 39 U/L    Bilirubin, Total 0.7 0.0 - 1.2 mg/dL    ALT 11 10 - 52 U/L   Troponin I, High Sensitivity, Initial   Result Value Ref Range    Troponin I, High Sensitivity <3 0 - 20 ng/L   Troponin, High Sensitivity, 1 Hour   Result Value Ref Range    Troponin I, High Sensitivity <3 0 - 20 ng/L   Lactate   Result Value Ref Range    Lactate 1.7 0.4 - 2.0 mmol/L   Urinalysis with Reflex Culture and Microscopic   Result Value Ref  Range    Color, Urine Light-Yellow Light-Yellow, Yellow, Dark-Yellow    Appearance, Urine Clear Clear    Specific Gravity, Urine 1.047 (N) 1.005 - 1.035    pH, Urine 6.0 5.0, 5.5, 6.0, 6.5, 7.0, 7.5, 8.0    Protein, Urine NEGATIVE NEGATIVE, 10 (TRACE), 20 (TRACE) mg/dL    Glucose, Urine Normal Normal mg/dL    Blood, Urine NEGATIVE NEGATIVE mg/dL    Ketones, Urine 10 (1+) (A) NEGATIVE mg/dL    Bilirubin, Urine NEGATIVE NEGATIVE mg/dL    Urobilinogen, Urine Normal Normal mg/dL    Nitrite, Urine NEGATIVE NEGATIVE    Leukocyte Esterase, Urine NEGATIVE NEGATIVE     CT abdomen pelvis w IV contrast    Result Date: 3/5/2025  STUDY: CT Abdomen and Pelvis with IV Contrast; 3/5/2025 4:58 PM INDICATION: Abdominal pain, unspecified. COMPARISON: None Available. ACCESSION NUMBER(S): [id number] ORDERING CLINICIAN: ARPAN ROBIN TECHNIQUE: CT of the abdomen and pelvis was performed.  Contiguous axial images were obtained at 3 mm slice thickness through the abdomen and pelvis. Coronal and sagittal reconstructions at 3 mm slice thickness were performed.  Omnipaque 350--75 mL was administered intravenously.  FINDINGS: LOWER CHEST: No cardiomegaly.  No pericardial effusion.  Lung bases are clear.  ABDOMEN:  LIVER: No hepatomegaly.  Smooth surface contour.  Normal attenuation.  BILE DUCTS: No intrahepatic or extrahepatic biliary ductal dilatation.  GALLBLADDER: The gallbladder is present without gallstones. STOMACH: No abnormalities identified.  PANCREAS: No masses or ductal dilatation.  SPLEEN: No splenomegaly or focal splenic lesion.  ADRENAL GLANDS: No thickening or nodules.  KIDNEYS AND URETERS: Kidneys are normal in size and location.  No renal or ureteral calculi.  PELVIS:  BLADDER: No abnormalities identified.  REPRODUCTIVE ORGANS: No abnormalities identified.  BOWEL: .  Appendix is normal.  Minimal thickened small bowel loops throughout the abdomen and pelvis with additional fluid-filled nondilated and minimally prominent loops  without a sharp transition.  Minimal wall thickening of portions of the colon with associated liquid stool  VESSELS: No abnormalities identified.  Abdominal aorta is normal in caliber.  PERITONEUM/RETROPERITONEUM/LYMPH NODES: No free fluid.  No pneumoperitoneum. No lymphadenopathy.  ABDOMINAL WALL: No abnormalities identified. SOFT TISSUES: No abnormalities identified.  BONES: No acute fracture or aggressive osseous lesion.    Findings suggestive of a mild enterocolitis. Signed by Deshawn Xavier MD    XR chest 1 view    Result Date: 3/5/2025  STUDY: Chest Radiograph; 03/05/2025, 4:36 PM INDICATION: Chest pain. COMPARISON: None Available ACCESSION NUMBER(S): [id number] ORDERING CLINICIAN: ARPAN ROBIN TECHNIQUE:  Frontal chest was obtained at 16:36 hours. FINDINGS: CARDIOMEDIASTINAL SILHOUETTE: Cardiomediastinal silhouette is normal in size and configuration.  LUNGS: Lungs are clear.  ABDOMEN: No remarkable upper abdominal findings.  BONES: No acute osseous changes.    No acute cardiopulmonary process. Signed by Bandar Tracey MD          Assessment/Plan   Patient is a 24-year-old transgender female with PMHx of TBI, JOSEMANUEL, depression, anxiety, on gender affirming hormonal therapy (estradiol, spironolactone, and progesterone), who presented to [facility] from home for abdominal pain/nausea/vomiting/diarrhea.  In ED, patient was tachycardic, and hypotensive.  CBC showed leukocytosis. CXR was unremarkable and CT abdomen pelvis showed mild enterocolitis. She received 2L IV fluids and blood pressure improved.  Also received Zofran, Rocephin, and Flagyl.  C. difficile PCR was ordered.  Patient was admitted for sepsis likely due to enterocolitis.      # Sepsis likely 2/2 Mild enterocolitis  # Hypotension  # Tachycardia  # Leukocytosis  Patient met SIRS criteria due to tachycardia, tachypnea, and leukocytosis. Patient's mild enterocolitis is likely due to infectious causes as patient does note recent sick contact of her  mother with similar symptoms.  Report of watery light brown stool is concerning for C. Difficile.  Less likely due to inflammatory bowel disease as there is no family history and no blood in stool.   -Admission HR 80--> 100s, BP 77/45--> 100s/50s  -Admission WBC 35.1, absolute neutrophil 32.22, lactate 1.7  -S/p 2L IV fluids, Zofran, Rocephin, and Flagyl in ED  -Patient noted to have 1 episode of emesis in ED  -Patient's hypotension responded to IV fluids in the ED with improvement to 100/50s  Plan  -FU blood cultures x 2  -FU stool pathogen panel and C. difficile  -FU CRP, ESR  -Continue ceftriaxone and Flagyl for empiric coverage of intra-abdominal infections  -Protonix 40 mg daily  -Maintenance NaCl at 75 mL/h  -Hold home spironolactone due to initial presentation of hypotension  -N.p.o. due to patient failing p.o. challenge  (1 episode of emesis) in ED  -Telemetry    Chronic conditions:  JOSEMANUEL  Anxiety  Depression  Gender affirming hormonal therapy    -Continue home medications as appropriate    Global Plan of Care  IVF: Maintenance IV fluids 75 mL/h  Diet: N.p.o.  DVT prophylaxis: None due to patient being low risk  Consults: None  Code Status: Full code    Dispo: Awaiting hemodynamic stabilization and resolution of GI symptoms.  Anticipate 1-2 midnight stays.        Alisha Christopher DO  Internal Medicine, PGY1      Senior Addendum:  This is a 23yo transgender female presenting for concern of 5 bouts of diarrhea that started this AM. Pt denies any history of travel however mother did recently have similar symptoms requiring hospitalization, unsure what mother's dx was and unable to contact her. Labs in ER were notable for remarkable leukocytosis and vitals did reveal initial hypotension. Given pts labs and vitals pt does meet SIRS criteria so was bolused appropriately with a subsequent improvement in her BP as a result. Pt was started on maintenance fluids. Pt denies any fevers which is reassuring and lactate was  wnl. CT did reveal mild enterocolitis. Pt was covered with rocephin and flagyl in ER. Stool pathogen panel and C.diff were ordered, pending, will tailor abx appropriately. Reassuring that pt has not had diarrhea since this AM. Less likely pt has IBD however ESR/CRP ordered, can consider calprotectin. Pt does endorse having sex with men without protection so there is concern for pt being more prone to infective disease as she is concerned for STDs. Pt claims has not had intercourse since June so unlikely to be acute bacterial cause but will order labs to rule out. Given her high risk behaviors and abdominal pain will also add Ova/para and appropriate STD testing respectively.

## 2025-03-06 NOTE — NURSING NOTE
Pt calm and cooperative through shift. Pt complained of cramping in abd and had Iv fluids run through shift. Pt tolerated diet. Pt rested through shift.

## 2025-03-07 ENCOUNTER — PHARMACY VISIT (OUTPATIENT)
Dept: PHARMACY | Facility: CLINIC | Age: 25
End: 2025-03-07
Payer: MEDICAID

## 2025-03-07 VITALS
DIASTOLIC BLOOD PRESSURE: 66 MMHG | OXYGEN SATURATION: 97 % | RESPIRATION RATE: 18 BRPM | WEIGHT: 152.34 LBS | TEMPERATURE: 97 F | BODY MASS INDEX: 23.09 KG/M2 | HEART RATE: 76 BPM | HEIGHT: 68 IN | SYSTOLIC BLOOD PRESSURE: 112 MMHG

## 2025-03-07 LAB
ALBUMIN SERPL BCP-MCNC: 3.2 G/DL (ref 3.4–5)
ANION GAP SERPL CALC-SCNC: 10 MMOL/L (ref 10–20)
BUN SERPL-MCNC: 7 MG/DL (ref 6–23)
C TRACH RRNA SPEC QL NAA+PROBE: NEGATIVE
CALCIUM SERPL-MCNC: 7.9 MG/DL (ref 8.6–10.3)
CHLORIDE SERPL-SCNC: 106 MMOL/L (ref 98–107)
CO2 SERPL-SCNC: 25 MMOL/L (ref 21–32)
CREAT SERPL-MCNC: 0.73 MG/DL (ref 0.5–1.3)
EGFRCR SERPLBLD CKD-EPI 2021: >90 ML/MIN/1.73M*2
ERYTHROCYTE [DISTWIDTH] IN BLOOD BY AUTOMATED COUNT: 11.9 % (ref 11.5–14.5)
GLUCOSE SERPL-MCNC: 86 MG/DL (ref 74–99)
HCT VFR BLD AUTO: 34.9 % (ref 36–52)
HGB BLD-MCNC: 11.5 G/DL (ref 12–17.5)
MAGNESIUM SERPL-MCNC: 1.75 MG/DL (ref 1.6–2.4)
MCH RBC QN AUTO: 30.2 PG (ref 26–34)
MCHC RBC AUTO-ENTMCNC: 33 G/DL (ref 32–36)
MCV RBC AUTO: 92 FL (ref 80–100)
N GONORRHOEA DNA SPEC QL PROBE+SIG AMP: NEGATIVE
NRBC BLD-RTO: 0 /100 WBCS (ref 0–0)
PHOSPHATE SERPL-MCNC: 1.8 MG/DL (ref 2.5–4.9)
PLATELET # BLD AUTO: 192 X10*3/UL (ref 150–450)
POTASSIUM SERPL-SCNC: 3.8 MMOL/L (ref 3.5–5.3)
RBC # BLD AUTO: 3.81 X10*6/UL (ref 4–5.9)
SODIUM SERPL-SCNC: 137 MMOL/L (ref 136–145)
WBC # BLD AUTO: 4.8 X10*3/UL (ref 4.4–11.3)

## 2025-03-07 PROCEDURE — 2500000001 HC RX 250 WO HCPCS SELF ADMINISTERED DRUGS (ALT 637 FOR MEDICARE OP)

## 2025-03-07 PROCEDURE — 80069 RENAL FUNCTION PANEL: CPT

## 2025-03-07 PROCEDURE — 84100 ASSAY OF PHOSPHORUS: CPT

## 2025-03-07 PROCEDURE — 99238 HOSP IP/OBS DSCHRG MGMT 30/<: CPT

## 2025-03-07 PROCEDURE — 85027 COMPLETE CBC AUTOMATED: CPT

## 2025-03-07 PROCEDURE — 36415 COLL VENOUS BLD VENIPUNCTURE: CPT

## 2025-03-07 PROCEDURE — 2500000004 HC RX 250 GENERAL PHARMACY W/ HCPCS (ALT 636 FOR OP/ED): Performed by: STUDENT IN AN ORGANIZED HEALTH CARE EDUCATION/TRAINING PROGRAM

## 2025-03-07 PROCEDURE — 2500000004 HC RX 250 GENERAL PHARMACY W/ HCPCS (ALT 636 FOR OP/ED)

## 2025-03-07 PROCEDURE — 83735 ASSAY OF MAGNESIUM: CPT

## 2025-03-07 PROCEDURE — RXMED WILLOW AMBULATORY MEDICATION CHARGE

## 2025-03-07 RX ORDER — ONDANSETRON 4 MG/1
4 TABLET, FILM COATED ORAL EVERY 8 HOURS PRN
Qty: 20 TABLET | Refills: 0 | Status: SHIPPED | OUTPATIENT
Start: 2025-03-07 | End: 2025-04-06

## 2025-03-07 RX ORDER — ESTRADIOL 2 MG/1
4 TABLET ORAL EVERY MORNING
Qty: 60 TABLET | Refills: 0 | Status: SHIPPED | OUTPATIENT
Start: 2025-03-07 | End: 2025-04-06

## 2025-03-07 RX ORDER — LOPERAMIDE HYDROCHLORIDE 2 MG/1
2 CAPSULE ORAL 4 TIMES DAILY PRN
Qty: 30 CAPSULE | Refills: 0 | Status: SHIPPED | OUTPATIENT
Start: 2025-03-07

## 2025-03-07 RX ORDER — LOPERAMIDE HYDROCHLORIDE 2 MG/1
2 CAPSULE ORAL 4 TIMES DAILY PRN
Status: DISCONTINUED | OUTPATIENT
Start: 2025-03-07 | End: 2025-03-07 | Stop reason: HOSPADM

## 2025-03-07 RX ADMIN — PANTOPRAZOLE SODIUM 40 MG: 40 INJECTION, POWDER, FOR SOLUTION INTRAVENOUS at 08:17

## 2025-03-07 RX ADMIN — ESTRADIOL 4 MG: 1 TABLET ORAL at 08:18

## 2025-03-07 RX ADMIN — SODIUM CHLORIDE, POTASSIUM CHLORIDE, SODIUM LACTATE AND CALCIUM CHLORIDE 1000 ML: 600; 310; 30; 20 INJECTION, SOLUTION INTRAVENOUS at 06:10

## 2025-03-07 SDOH — HEALTH STABILITY: PHYSICAL HEALTH
HOW OFTEN DO YOU NEED TO HAVE SOMEONE HELP YOU WHEN YOU READ INSTRUCTIONS, PAMPHLETS, OR OTHER WRITTEN MATERIAL FROM YOUR DOCTOR OR PHARMACY?: NEVER

## 2025-03-07 SDOH — HEALTH STABILITY: PHYSICAL HEALTH: ON AVERAGE, HOW MANY MINUTES DO YOU ENGAGE IN EXERCISE AT THIS LEVEL?: 60 MIN

## 2025-03-07 SDOH — SOCIAL STABILITY: SOCIAL NETWORK
IN A TYPICAL WEEK, HOW MANY TIMES DO YOU TALK ON THE PHONE WITH FAMILY, FRIENDS, OR NEIGHBORS?: MORE THAN THREE TIMES A WEEK

## 2025-03-07 SDOH — SOCIAL STABILITY: SOCIAL INSECURITY: ARE YOU MARRIED, WIDOWED, DIVORCED, SEPARATED, NEVER MARRIED, OR LIVING WITH A PARTNER?: NEVER MARRIED

## 2025-03-07 SDOH — SOCIAL STABILITY: SOCIAL NETWORK: HOW OFTEN DO YOU ATTEND MEETINGS OF THE CLUBS OR ORGANIZATIONS YOU BELONG TO?: NEVER

## 2025-03-07 SDOH — SOCIAL STABILITY: SOCIAL NETWORK
DO YOU BELONG TO ANY CLUBS OR ORGANIZATIONS SUCH AS CHURCH GROUPS, UNIONS, FRATERNAL OR ATHLETIC GROUPS, OR SCHOOL GROUPS?: NO

## 2025-03-07 SDOH — SOCIAL STABILITY: SOCIAL NETWORK: HOW OFTEN DO YOU ATTEND CHURCH OR RELIGIOUS SERVICES?: NEVER

## 2025-03-07 SDOH — HEALTH STABILITY: PHYSICAL HEALTH: ON AVERAGE, HOW MANY DAYS PER WEEK DO YOU ENGAGE IN MODERATE TO STRENUOUS EXERCISE (LIKE A BRISK WALK)?: 5 DAYS

## 2025-03-07 SDOH — SOCIAL STABILITY: SOCIAL NETWORK: HOW OFTEN DO YOU GET TOGETHER WITH FRIENDS OR RELATIVES?: MORE THAN THREE TIMES A WEEK

## 2025-03-07 ASSESSMENT — COGNITIVE AND FUNCTIONAL STATUS - GENERAL: MOBILITY SCORE: 24

## 2025-03-07 ASSESSMENT — PAIN SCALES - GENERAL
PAINLEVEL_OUTOF10: 0 - NO PAIN

## 2025-03-07 ASSESSMENT — PAIN - FUNCTIONAL ASSESSMENT
PAIN_FUNCTIONAL_ASSESSMENT: 0-10
PAIN_FUNCTIONAL_ASSESSMENT: 0-10

## 2025-03-07 NOTE — CARE PLAN
The patient's goals for the shift include      The clinical goals for the shift include Pt will be evaluated for discharge by end of shift 3/7/25    Pt to be discharged shortly . She is medically cleared.

## 2025-03-07 NOTE — NURSING NOTE
Shift note -   0930 - Pt waiting on breakfast to confirm that she is able to tolerate diet. Pt took her meds with no issues. Pt denies current pain or nausea. She has a ride coming once discharge is arranged. Call light in reach.     1130 - Pt ready for discharge. She tolerated food ok and voices no current needs. Reviewed discharge paperwork and no questions from pt. She received her meds to bed and has no questions at this time. Pt has a ride on the way. IV and tele removed so pt could get ready and change .

## 2025-03-07 NOTE — HOSPITAL COURSE
Ms. Layne Razo is a 24 y.o. adult with PMH of TBI, anxiety, depression, and gender confirming hormones (estradiol, spironolactone, and progesterone) presents with mild enterocolitis who presents 3/5 with significant nausea, vomiting, diarrhea.  In the ER patient was hypotensive to 77/45 but fluid responsive to 2 L IV fluids.  Given Zofran, and started on Rocephin and Flagyl due to concerns for enterocolitis on CT abdomen and pelvis.  C. difficile PCR ordered as well as stool studies.  Blood pressure and proved with fluids, initial leukocytosis elevated to 35.1.  Lactate within normal limits.  Patient started on Protonix 40 mg daily, maintenance fluids started.  Patient failed initial p.o. challenge with an episode of emesis.  3/6: Continue to have multiple episodes of nonbloody emesis and nonbloody diarrhea.  Patient feeling improved from yesterday and Zofran/Compazine working to ameliorate the nausea.  Potassium and mag replaced.  Leukocytosis resolved.  3/7:  stool studies positive for norovirus. HIV antigen negative,  C. difficile, gonorrhea, chlamydia all negative.  Patient is feeling significantly improved.  No evidence of nausea/vomiting overnight.  Continues to have loose bowel movements.  Cultures no growth at 1 day.  Patient agreeable for discharge with Zofran, Imodium and continue with supportive care at home.

## 2025-03-07 NOTE — DISCHARGE INSTRUCTIONS
You were admitted to the hospital with nausea, vomiting, diarrhea, and abdominal pain.  This is due to an infection called norovirus.  As we discussed, the typical time course is anywhere from 24 to 96 hours.  You have been able to tolerate a diet and your diarrhea is improving.  I have sent a prescription for Zofran, and Imodium to your pharmacy.  The Zofran can be used every 8 hours as needed for nausea and vomiting.  The Imodium can be used up to 4 times a day for diarrhea.  I have also sent a referral in for you to establish care with a new primary care physician.  His name is Dr. Rashel Mccracken, and he is based in [place].  I have also sent a refill for your Estrace to your [facility] pharmacy in [place].    We are glad you are doing better.  Thank you for allowing us to participate in your care.  It has been a pleasure taking care of you in the hospital.    -[facility] Teaching Team

## 2025-03-07 NOTE — CARE PLAN
Problem: Fall/Injury  Goal: Not fall by end of shift  Outcome: Progressing     Problem: Pain - Adult  Goal: Verbalizes/displays adequate comfort level or baseline comfort level  Outcome: Progressing     Problem: Safety - Adult  Goal: Free from fall injury  Outcome: Progressing     Problem: Chronic Conditions and Co-morbidities  Goal: Patient's chronic conditions and co-morbidity symptoms are monitored and maintained or improved  Outcome: Progressing    The patient's goals for the shift include  feel better    The clinical goals for the shift include patient to maintain HDS vitals throughout end of shift

## 2025-03-07 NOTE — DISCHARGE SUMMARY
Discharge Diagnosis  Enterocolitis    Issues Requiring Follow-Up  # Enterocolitis secondary to norovirus  -Zofran 4 mg every 8 hours as needed for nausea and vomiting  -Imodium 4 times a day as needed for diarrhea  -Please follow-up with your new PCP, Dr. Rashel Mccracken.  -Urged to continue adequate oral intake of fluids and advance diet as tolerated      Discharge Meds     Medication List      START taking these medications     loperamide 2 mg capsule; Commonly known as: Imodium; Take 1 capsule (2   mg) by mouth 4 times a day as needed for diarrhea.   ondansetron 4 mg tablet; Commonly known as: Zofran; Take 1 tablet (4 mg)   by mouth every 8 hours if needed for nausea or vomiting.     CONTINUE taking these medications     * estradiol 2 mg tablet; Commonly known as: Estrace   * estradiol 2 mg tablet; Commonly known as: Estrace; Take 2 tablets (4   mg) by mouth once daily in the morning.   progesterone 200 mg capsule; Commonly known as: Prometrium   spironolactone 100 mg tablet; Commonly known as: Aldactone  * This list has 2 medication(s) that are the same as other medications   prescribed for you. Read the directions carefully, and ask your doctor or   other care provider to review them with you.       Test Results Pending At Discharge  Pending Labs       Order Current Status    Cryptosporidium Antigen, Stool In process    Giardia Antigen In process    Ova and Parasite Examination In process    Ova/Para + Giardia/Cryptosporidium Antigen In process    Blood Culture Preliminary result    Blood Culture Preliminary result            Hospital Course  Ms. Layne Razo is a 24 y.o. adult with PMH of TBI, anxiety, depression, and gender confirming hormones (estradiol, spironolactone, and progesterone) presents with mild enterocolitis who presents 3/5 with significant nausea, vomiting, diarrhea.  In the ER patient was hypotensive to 77/45 but fluid responsive to 2 L IV fluids.  Given Zofran, and started on Rocephin and  Flagyl due to concerns for enterocolitis on CT abdomen and pelvis.  C. difficile PCR ordered as well as stool studies.  Blood pressure and proved with fluids, initial leukocytosis elevated to 35.1.  Lactate within normal limits.  Patient started on Protonix 40 mg daily, maintenance fluids started.  Patient failed initial p.o. challenge with an episode of emesis.  3/6: Continue to have multiple episodes of nonbloody emesis and nonbloody diarrhea.  Patient feeling improved from yesterday and Zofran/Compazine working to ameliorate the nausea.  Potassium and mag replaced.  Leukocytosis resolved.  3/7:  stool studies positive for norovirus. HIV antigen negative,  C. difficile, gonorrhea, chlamydia all negative.  Patient is feeling significantly improved.  No evidence of nausea/vomiting overnight.  Continues to have loose bowel movements.  Cultures no growth at 1 day.  Patient agreeable for discharge with Zofran, Imodium and continue with supportive care at home.    Pertinent Physical Exam At Time of Discharge  Constitutional:       General: She is not in acute distress.     Appearance: She is not ill-appearing.   HENT:      Nose: No congestion or rhinorrhea.      Mouth/Throat:      Mouth: Mucous membranes are moist.  Cardiovascular:      Rate and Rhythm: Regular rhythm.  Regular rate     Pulses: Normal pulses.      Heart sounds: No murmur heard.     No gallop.   Pulmonary:      Effort: No respiratory distress.      Breath sounds: No wheezing or rales.   Chest:      Chest wall: No tenderness.   Abdominal:      Palpations: Abdomen is soft. There is no fluid wave, hepatomegaly, splenomegaly or mass.      Tenderness: There is no guarding or rebound.      Comments: Mild midline/periumbilical abdominal pain.  Hyperactive bowel sounds.   Musculoskeletal:         General: No swelling.   Skin:     Coloration: Skin is not jaundiced.      Findings: No erythema, lesion or rash.   Neurological:      Mental Status: She is oriented to  person, place, and time.    Outpatient Follow-Up  No future appointments.      Rodney Avila MD

## 2025-03-07 NOTE — PROGRESS NOTES
03/07/25 0845   Discharge Planning   Number of Stairs to Enter Residence 2   Number of Stairs Within Residence 13   Do you have animals or pets at home? Yes   Type of Animals or Pets 1 cat   Who is requesting discharge planning? Provider   Does the patient need discharge transport arranged? No   Patient Choice   Patient / Family choosing to utilize agency / facility established prior to hospitalization No   Stroke Family Assessment   Stroke Family Assessment Needed No   Intensity of Service   Intensity of Service 0-30 min     Introduced myself and my role. Confirmed patient information. Street is [address], corrected in system. Lives with mother.  Uses no DME. Sees Dr. Reg Galloway at [facility]. No home going needs assessed.

## 2025-03-08 LAB
CRYPTOSP AG STL QL IA: NEGATIVE
G LAMBLIA AG STL QL IA: NEGATIVE

## 2025-03-09 LAB
BACTERIA BLD CULT: NORMAL
BACTERIA BLD CULT: NORMAL

## 2025-03-10 LAB
ATRIAL RATE: 106 BPM
ATRIAL RATE: 82 BPM
BACTERIA BLD CULT: NORMAL
BACTERIA BLD CULT: NORMAL
P AXIS: 52 DEGREES
P AXIS: 63 DEGREES
P OFFSET: 193 MS
P OFFSET: 196 MS
P ONSET: 140 MS
P ONSET: 143 MS
PR INTERVAL: 154 MS
PR INTERVAL: 162 MS
Q ONSET: 220 MS
Q ONSET: 221 MS
QRS COUNT: 14 BEATS
QRS COUNT: 17 BEATS
QRS DURATION: 94 MS
QRS DURATION: 98 MS
QT INTERVAL: 348 MS
QT INTERVAL: 374 MS
QTC CALCULATION(BAZETT): 436 MS
QTC CALCULATION(BAZETT): 462 MS
QTC FREDERICIA: 415 MS
QTC FREDERICIA: 420 MS
R AXIS: 62 DEGREES
R AXIS: 74 DEGREES
T AXIS: 63 DEGREES
T AXIS: 75 DEGREES
T OFFSET: 394 MS
T OFFSET: 408 MS
VENTRICULAR RATE: 106 BPM
VENTRICULAR RATE: 82 BPM

## 2025-03-11 LAB — O+P STL MICRO: NEGATIVE

## 2025-04-01 ENCOUNTER — APPOINTMENT (OUTPATIENT)
Dept: PRIMARY CARE | Facility: CLINIC | Age: 25
End: 2025-04-01
Payer: COMMERCIAL

## 2025-04-23 ENCOUNTER — APPOINTMENT (OUTPATIENT)
Dept: PRIMARY CARE | Facility: CLINIC | Age: 25
End: 2025-04-23
Payer: COMMERCIAL

## 2025-04-23 VITALS
DIASTOLIC BLOOD PRESSURE: 66 MMHG | OXYGEN SATURATION: 98 % | HEIGHT: 68 IN | WEIGHT: 152 LBS | RESPIRATION RATE: 18 BRPM | SYSTOLIC BLOOD PRESSURE: 102 MMHG | HEART RATE: 74 BPM | TEMPERATURE: 98 F | BODY MASS INDEX: 23.04 KG/M2

## 2025-04-23 DIAGNOSIS — E86.0 DEHYDRATION: Primary | ICD-10-CM

## 2025-04-23 DIAGNOSIS — D64.9 ANEMIA, UNSPECIFIED TYPE: ICD-10-CM

## 2025-04-23 DIAGNOSIS — R53.83 OTHER FATIGUE: ICD-10-CM

## 2025-04-23 PROCEDURE — 1036F TOBACCO NON-USER: CPT

## 2025-04-23 PROCEDURE — 3008F BODY MASS INDEX DOCD: CPT

## 2025-04-23 PROCEDURE — 99204 OFFICE O/P NEW MOD 45 MIN: CPT

## 2025-04-23 ASSESSMENT — PATIENT HEALTH QUESTIONNAIRE - PHQ9
SUM OF ALL RESPONSES TO PHQ QUESTIONS 1-9: 10
9. THOUGHTS THAT YOU WOULD BE BETTER OFF DEAD, OR OF HURTING YOURSELF: SEVERAL DAYS
7. TROUBLE CONCENTRATING ON THINGS, SUCH AS READING THE NEWSPAPER OR WATCHING TELEVISION: NOT AT ALL
1. LITTLE INTEREST OR PLEASURE IN DOING THINGS: MORE THAN HALF THE DAYS
6. FEELING BAD ABOUT YOURSELF - OR THAT YOU ARE A FAILURE OR HAVE LET YOURSELF OR YOUR FAMILY DOWN: SEVERAL DAYS
10. IF YOU CHECKED OFF ANY PROBLEMS, HOW DIFFICULT HAVE THESE PROBLEMS MADE IT FOR YOU TO DO YOUR WORK, TAKE CARE OF THINGS AT HOME, OR GET ALONG WITH OTHER PEOPLE: SOMEWHAT DIFFICULT
5. POOR APPETITE OR OVEREATING: MORE THAN HALF THE DAYS
3. TROUBLE FALLING OR STAYING ASLEEP OR SLEEPING TOO MUCH: SEVERAL DAYS
4. FEELING TIRED OR HAVING LITTLE ENERGY: SEVERAL DAYS
2. FEELING DOWN, DEPRESSED OR HOPELESS: SEVERAL DAYS
SUM OF ALL RESPONSES TO PHQ9 QUESTIONS 1 AND 2: 3
8. MOVING OR SPEAKING SO SLOWLY THAT OTHER PEOPLE COULD HAVE NOTICED. OR THE OPPOSITE, BEING SO FIGETY OR RESTLESS THAT YOU HAVE BEEN MOVING AROUND A LOT MORE THAN USUAL: SEVERAL DAYS

## 2025-04-23 NOTE — PROGRESS NOTES
"Subjective   Patient ID: Michelle Razo is a 24 y.o. adult who presents for Establish Care (New patient to establish care. /Wants to follow up on hospital stay for norovirus.).    HPI   New patient here today for hospital follow up.  Was recently hospitalized for norovirus/dehydration  Ongoing symptoms at this time include appetite changes (waxes and wanes between extreme hunger and no appetite), ongoing ocassional abdominal discomfort (periumbilical) as well as fatigue (particularly at the end of the day). All of ongoing issues have been present since discharge  Hospitalization was notable for significant hypotension (77/45 on admission). Total hospital stay 3 nights. Recovered after IV fluids and slow advancement of diet.  Diet has largely returned to normal, attempts to eat balanced.    Was not previously following with PCP. Does follow with [facility].     Review of Systems    Objective   /66 (BP Location: Right arm, Patient Position: Sitting)   Pulse 74   Temp 36.7 °C (98 °F)   Resp 18   Ht 1.727 m (5' 8\")   Wt 68.9 kg (152 lb)   SpO2 98%   BMI 23.11 kg/m²     Physical Exam  Constitutional:       General: She is not in acute distress.     Appearance: Normal appearance. She is not ill-appearing.   Eyes:      General: No scleral icterus.  Cardiovascular:      Rate and Rhythm: Normal rate and regular rhythm.      Heart sounds: No murmur heard.     No friction rub. No gallop.   Pulmonary:      Effort: Pulmonary effort is normal. No respiratory distress.      Breath sounds: Normal breath sounds. No wheezing, rhonchi or rales.   Musculoskeletal:      Right lower leg: No edema.      Left lower leg: No edema.   Neurological:      General: No focal deficit present.      Mental Status: She is alert and oriented to person, place, and time.   Psychiatric:         Mood and Affect: Mood normal.         Behavior: Behavior normal.         Assessment/Plan   Problem List Items Addressed This Visit  "   None  Visit Diagnoses         Codes      Dehydration    -  Primary E86.0    Relevant Orders    Renal function panel    CBC and Auto Differential    Magnesium      Anemia, unspecified type     D64.9      Other fatigue     R53.83    Relevant Orders    Tsh With Reflex To Free T4 If Abnormal        Reviewed documentation along with laboratory/imaging results from recent hospitalization.  Will repeat labs at this time given multiple abnormalities at time of discharge, sounds to have largely returned to baseline GI status however does still admit to have ongoing complaints.  In the interim recommend patient increase probiotics within diet: Discussed multiple foods that are rich and beneficial to underlying gut jaron  Recommend return to office in 1 month for annual physical, will have sooner evaluation should any laboratory results warrant.    Rashel Mccracken, DO

## 2025-04-24 LAB
ALBUMIN SERPL-MCNC: 4.8 G/DL (ref 3.6–5.1)
BASOPHILS # BLD AUTO: 26 CELLS/UL (ref 0–200)
BASOPHILS NFR BLD AUTO: 0.3 %
BUN SERPL-MCNC: 11 MG/DL (ref 7–25)
BUN/CREAT SERPL: NORMAL (CALC) (ref 6–22)
CALCIUM SERPL-MCNC: 9.4 MG/DL (ref 8.6–10.3)
CHLORIDE SERPL-SCNC: 105 MMOL/L (ref 98–110)
CO2 SERPL-SCNC: 25 MMOL/L (ref 20–32)
CREAT SERPL-MCNC: 0.64 MG/DL (ref 0.6–1.24)
EGFRCR SERPLBLD CKD-EPI 2021: 136 ML/MIN/1.73M2
EOSINOPHIL # BLD AUTO: 181 CELLS/UL (ref 15–500)
EOSINOPHIL NFR BLD AUTO: 2.1 %
ERYTHROCYTE [DISTWIDTH] IN BLOOD BY AUTOMATED COUNT: 11.3 % (ref 11–15)
GLUCOSE SERPL-MCNC: 88 MG/DL (ref 65–99)
HCT VFR BLD AUTO: 43.7 % (ref 38.5–50)
HGB BLD-MCNC: 14.6 G/DL (ref 13.2–17.1)
LYMPHOCYTES # BLD AUTO: 1866 CELLS/UL (ref 850–3900)
LYMPHOCYTES NFR BLD AUTO: 21.7 %
MAGNESIUM SERPL-MCNC: 2 MG/DL (ref 1.5–2.5)
MCH RBC QN AUTO: 30.4 PG (ref 27–33)
MCHC RBC AUTO-ENTMCNC: 33.4 G/DL (ref 32–36)
MCV RBC AUTO: 91 FL (ref 80–100)
MONOCYTES # BLD AUTO: 499 CELLS/UL (ref 200–950)
MONOCYTES NFR BLD AUTO: 5.8 %
NEUTROPHILS # BLD AUTO: 6029 CELLS/UL (ref 1500–7800)
NEUTROPHILS NFR BLD AUTO: 70.1 %
PHOSPHATE SERPL-MCNC: 2.9 MG/DL (ref 2.5–4.5)
PLATELET # BLD AUTO: 291 THOUSAND/UL (ref 140–400)
PMV BLD REES-ECKER: 11.4 FL (ref 7.5–12.5)
POTASSIUM SERPL-SCNC: 4.6 MMOL/L (ref 3.5–5.3)
RBC # BLD AUTO: 4.8 MILLION/UL (ref 4.2–5.8)
SODIUM SERPL-SCNC: 138 MMOL/L (ref 135–146)
TSH SERPL-ACNC: 1.21 MIU/L (ref 0.4–4.5)
WBC # BLD AUTO: 8.6 THOUSAND/UL (ref 3.8–10.8)

## 2025-05-27 ENCOUNTER — APPOINTMENT (OUTPATIENT)
Dept: PRIMARY CARE | Facility: CLINIC | Age: 25
End: 2025-05-27
Payer: COMMERCIAL

## 2025-05-27 VITALS
TEMPERATURE: 97.5 F | BODY MASS INDEX: 22.73 KG/M2 | OXYGEN SATURATION: 97 % | RESPIRATION RATE: 18 BRPM | HEIGHT: 68 IN | SYSTOLIC BLOOD PRESSURE: 112 MMHG | DIASTOLIC BLOOD PRESSURE: 78 MMHG | WEIGHT: 150 LBS | HEART RATE: 82 BPM

## 2025-05-27 DIAGNOSIS — Z00.00 WELLNESS EXAMINATION: ICD-10-CM

## 2025-05-27 DIAGNOSIS — F32.1 DEPRESSION, MAJOR, SINGLE EPISODE, MODERATE (MULTI): ICD-10-CM

## 2025-05-27 DIAGNOSIS — Z13.220 SCREENING FOR HYPERLIPIDEMIA: Primary | ICD-10-CM

## 2025-05-27 PROBLEM — K52.9 ENTEROCOLITIS: Status: RESOLVED | Noted: 2025-03-05 | Resolved: 2025-05-27

## 2025-05-27 PROCEDURE — 3008F BODY MASS INDEX DOCD: CPT

## 2025-05-27 PROCEDURE — 99395 PREV VISIT EST AGE 18-39: CPT

## 2025-05-27 PROCEDURE — 99213 OFFICE O/P EST LOW 20 MIN: CPT

## 2025-05-27 PROCEDURE — 1036F TOBACCO NON-USER: CPT

## 2025-05-27 SDOH — ECONOMIC STABILITY: INCOME INSECURITY: IN THE LAST 12 MONTHS, WAS THERE A TIME WHEN YOU WERE NOT ABLE TO PAY THE MORTGAGE OR RENT ON TIME?: NO

## 2025-05-27 SDOH — ECONOMIC STABILITY: FOOD INSECURITY: WITHIN THE PAST 12 MONTHS, YOU WORRIED THAT YOUR FOOD WOULD RUN OUT BEFORE YOU GOT MONEY TO BUY MORE.: NEVER TRUE

## 2025-05-27 SDOH — ECONOMIC STABILITY: TRANSPORTATION INSECURITY
IN THE PAST 12 MONTHS, HAS THE LACK OF TRANSPORTATION KEPT YOU FROM MEDICAL APPOINTMENTS OR FROM GETTING MEDICATIONS?: NO

## 2025-05-27 SDOH — HEALTH STABILITY: PHYSICAL HEALTH: ON AVERAGE, HOW MANY MINUTES DO YOU ENGAGE IN EXERCISE AT THIS LEVEL?: 60 MIN

## 2025-05-27 SDOH — ECONOMIC STABILITY: TRANSPORTATION INSECURITY
IN THE PAST 12 MONTHS, HAS LACK OF TRANSPORTATION KEPT YOU FROM MEETINGS, WORK, OR FROM GETTING THINGS NEEDED FOR DAILY LIVING?: NO

## 2025-05-27 SDOH — ECONOMIC STABILITY: FOOD INSECURITY: WITHIN THE PAST 12 MONTHS, THE FOOD YOU BOUGHT JUST DIDN'T LAST AND YOU DIDN'T HAVE MONEY TO GET MORE.: NEVER TRUE

## 2025-05-27 SDOH — HEALTH STABILITY: PHYSICAL HEALTH: ON AVERAGE, HOW MANY DAYS PER WEEK DO YOU ENGAGE IN MODERATE TO STRENUOUS EXERCISE (LIKE A BRISK WALK)?: 4 DAYS

## 2025-05-27 SDOH — ECONOMIC STABILITY: GENERAL
WHICH OF THE FOLLOWING DO YOU KNOW HOW TO USE AND HAVE ACCESS TO EVERY DAY? (CHOOSE ALL THAT APPLY): DESKTOP COMPUTER, LAPTOP COMPUTER, OR TABLET WITH BROADBAND INTERNET CONNECTION;SMARTPHONE WITH CELLULAR DATA PLAN

## 2025-05-27 ASSESSMENT — LIFESTYLE VARIABLES
AUDIT-C TOTAL SCORE: 0
HOW OFTEN DO YOU HAVE A DRINK CONTAINING ALCOHOL: NEVER
SKIP TO QUESTIONS 9-10: 1
HOW MANY STANDARD DRINKS CONTAINING ALCOHOL DO YOU HAVE ON A TYPICAL DAY: PATIENT DOES NOT DRINK
HOW OFTEN DO YOU HAVE SIX OR MORE DRINKS ON ONE OCCASION: NEVER

## 2025-05-27 ASSESSMENT — SOCIAL DETERMINANTS OF HEALTH (SDOH)
WITHIN THE LAST YEAR, HAVE TO BEEN RAPED OR FORCED TO HAVE ANY KIND OF SEXUAL ACTIVITY BY YOUR PARTNER OR EX-PARTNER?: NO
HOW OFTEN DO YOU ATTEND CHURCH OR RELIGIOUS SERVICES?: NEVER
WITHIN THE LAST YEAR, HAVE YOU BEEN KICKED, HIT, SLAPPED, OR OTHERWISE PHYSICALLY HURT BY YOUR PARTNER OR EX-PARTNER?: NO
ARE YOU MARRIED, WIDOWED, DIVORCED, SEPARATED, NEVER MARRIED, OR LIVING WITH A PARTNER?: NEVER MARRIED
HOW OFTEN DO YOU GET TOGETHER WITH FRIENDS OR RELATIVES?: MORE THAN THREE TIMES A WEEK
HOW HARD IS IT FOR YOU TO PAY FOR THE VERY BASICS LIKE FOOD, HOUSING, MEDICAL CARE, AND HEATING?: NOT VERY HARD
IN A TYPICAL WEEK, HOW MANY TIMES DO YOU TALK ON THE PHONE WITH FAMILY, FRIENDS, OR NEIGHBORS?: MORE THAN THREE TIMES A WEEK
HOW OFTEN DO YOU ATTENT MEETINGS OF THE CLUB OR ORGANIZATION YOU BELONG TO?: NEVER
IN THE PAST 12 MONTHS, HAS THE ELECTRIC, GAS, OIL, OR WATER COMPANY THREATENED TO SHUT OFF SERVICE IN YOUR HOME?: NO
WITHIN THE LAST YEAR, HAVE YOU BEEN AFRAID OF YOUR PARTNER OR EX-PARTNER?: NO
WITHIN THE LAST YEAR, HAVE YOU BEEN HUMILIATED OR EMOTIONALLY ABUSED IN OTHER WAYS BY YOUR PARTNER OR EX-PARTNER?: NO

## 2025-05-27 ASSESSMENT — ENCOUNTER SYMPTOMS
OCCASIONAL FEELINGS OF UNSTEADINESS: 0
DEPRESSION: 0
LOSS OF SENSATION IN FEET: 0

## 2025-05-27 ASSESSMENT — PATIENT HEALTH QUESTIONNAIRE - PHQ9
1. LITTLE INTEREST OR PLEASURE IN DOING THINGS: NOT AT ALL
2. FEELING DOWN, DEPRESSED OR HOPELESS: NOT AT ALL
SUM OF ALL RESPONSES TO PHQ9 QUESTIONS 1 & 2: 0

## 2025-05-27 NOTE — PROGRESS NOTES
"Subjective   Patient ID: Michelle Razo is a 24 y.o. adult who presents for Cough (Cough and congestion x  6 days. Denies fever.) and Annual Exam.    Cough       Patient here today for annual exam  Acute concerns at this include URI however denies any fever or other systemic manifestations.    Diet: Probiotics, tries to eat balanced, will eat fast food occasional  Sleep: Some congestion with current symptoms waking her in the AM however normally is sleeping nights through and feeling well rested  Exercise: Non-regimented, will go for walks  Mood: In a bit of a lull however feels that through support systems in place she is able to cope will with stressors.  Sexual health: no concerns    Review of Systems  12 Point ROS negative unless stated otherwise in HPI  Objective   /78 (BP Location: Right arm, Patient Position: Sitting)   Pulse 82   Temp 36.4 °C (97.5 °F)   Resp 18   Ht 1.727 m (5' 8\")   Wt 68 kg (150 lb)   SpO2 97%   BMI 22.81 kg/m²     Physical Exam  Constitutional:       General: She is not in acute distress.     Appearance: She is not ill-appearing.   HENT:      Right Ear: Tympanic membrane normal.      Left Ear: Tympanic membrane normal.      Nose: Nose normal. No congestion.      Mouth/Throat:      Pharynx: No oropharyngeal exudate or posterior oropharyngeal erythema.   Eyes:      General: No scleral icterus.     Extraocular Movements: Extraocular movements intact.   Cardiovascular:      Rate and Rhythm: Normal rate and regular rhythm.      Pulses: Normal pulses.      Heart sounds: Normal heart sounds. No murmur heard.     No friction rub. No gallop.   Pulmonary:      Effort: Pulmonary effort is normal.      Breath sounds: Normal breath sounds. No wheezing, rhonchi or rales.   Abdominal:      General: Abdomen is flat. There is no distension.      Palpations: Abdomen is soft.      Tenderness: There is no abdominal tenderness. There is no guarding.   Musculoskeletal:      Cervical back: No " tenderness.      Right lower leg: No edema.      Left lower leg: No edema.   Lymphadenopathy:      Cervical: No cervical adenopathy.   Skin:     General: Skin is warm and dry.      Findings: No rash.   Neurological:      General: No focal deficit present.      Mental Status: She is alert and oriented to person, place, and time.      Cranial Nerves: No cranial nerve deficit.      Deep Tendon Reflexes: Reflexes normal.   Psychiatric:         Mood and Affect: Mood normal.         Behavior: Behavior normal.         Assessment/Plan   Problem List Items Addressed This Visit           ICD-10-CM    Depression, major, single episode, moderate (Multi) F32.1    States he feels a little bit in a role at this time however does not feel symptoms are insurmountable with support network that patient has in place  Has follow-up with psychology multiple times in the past however not currently seeing psychologist.  Offered referral however patient declined.  Denies any SI or self-harm ideation.  Will continue to monitor  Reviewed previous notes from psychiatry along with [facility] where patient receives gender affirming care         Wellness examination Z00.00     Other Visit Diagnoses         Codes      Screening for hyperlipidemia    -  Primary Z13.220    Relevant Orders    Lipid panel        Complete hyperlipidemia screening at this time.  Fasting blood work ordered.  Does have history of MI within father therefore we will ensure early familial hyperlipidemia  Counseled patient on diet and exercise, recommendations based upon American [employer] Association exercise recommendations: At least 150 minutes per week of moderate-intensity aerobic activity or 75 minutes per week of vigorous aerobic activity, or a combination of both, preferably spread throughout the week.  Add to moderate or high-intensity training, muscle-strengthening activity (such as resistance or weights) on at least 2 days per week.    Otherwise patient may follow-up  in 1 year or sooner should any acute concerns should arise    Rashel Mccracken, DO

## 2025-05-27 NOTE — ASSESSMENT & PLAN NOTE
States he feels a little bit in a role at this time however does not feel symptoms are insurmountable with support network that patient has in place  Has follow-up with psychology multiple times in the past however not currently seeing psychologist.  Offered referral however patient declined.  Denies any SI or self-harm ideation.  Will continue to monitor  Reviewed previous notes from psychiatry along with [facility] where patient receives gender affirming care

## 2025-06-03 ENCOUNTER — APPOINTMENT (OUTPATIENT)
Dept: PRIMARY CARE | Facility: CLINIC | Age: 25
End: 2025-06-03
Payer: COMMERCIAL

## 2025-08-28 ENCOUNTER — APPOINTMENT (OUTPATIENT)
Dept: OPHTHALMOLOGY | Facility: CLINIC | Age: 25
End: 2025-08-28
Payer: COMMERCIAL

## 2025-09-03 ENCOUNTER — APPOINTMENT (OUTPATIENT)
Dept: OPHTHALMOLOGY | Facility: CLINIC | Age: 25
End: 2025-09-03
Payer: COMMERCIAL

## 2025-10-01 ENCOUNTER — APPOINTMENT (OUTPATIENT)
Dept: OPHTHALMOLOGY | Facility: CLINIC | Age: 25
End: 2025-10-01
Payer: COMMERCIAL